# Patient Record
Sex: FEMALE | Race: WHITE | NOT HISPANIC OR LATINO | URBAN - METROPOLITAN AREA
[De-identification: names, ages, dates, MRNs, and addresses within clinical notes are randomized per-mention and may not be internally consistent; named-entity substitution may affect disease eponyms.]

---

## 2017-10-20 ENCOUNTER — EMERGENCY (EMERGENCY)
Facility: HOSPITAL | Age: 67
LOS: 1 days | Discharge: HOME | End: 2017-10-20
Attending: EMERGENCY MEDICINE | Admitting: EMERGENCY MEDICINE
Payer: MEDICARE

## 2017-10-20 VITALS
HEART RATE: 75 BPM | OXYGEN SATURATION: 97 % | TEMPERATURE: 98 F | SYSTOLIC BLOOD PRESSURE: 140 MMHG | DIASTOLIC BLOOD PRESSURE: 71 MMHG | RESPIRATION RATE: 18 BRPM

## 2017-10-20 DIAGNOSIS — S00.83XA CONTUSION OF OTHER PART OF HEAD, INITIAL ENCOUNTER: ICD-10-CM

## 2017-10-20 DIAGNOSIS — S09.93XA UNSPECIFIED INJURY OF FACE, INITIAL ENCOUNTER: ICD-10-CM
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
## 2017-10-20 DIAGNOSIS — S46.112A STRAIN OF MUSCLE, FASCIA AND TENDON OF LONG HEAD OF BICEPS, LEFT ARM, INITIAL ENCOUNTER: Chronic | ICD-10-CM

## 2017-10-20 DIAGNOSIS — E78.5 HYPERLIPIDEMIA, UNSPECIFIED: ICD-10-CM

## 2017-10-20 DIAGNOSIS — J45.909 UNSPECIFIED ASTHMA, UNCOMPLICATED: ICD-10-CM

## 2017-10-20 DIAGNOSIS — Z96.652 PRESENCE OF LEFT ARTIFICIAL KNEE JOINT: Chronic | ICD-10-CM
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
## 2017-10-20 DIAGNOSIS — I10 ESSENTIAL (PRIMARY) HYPERTENSION: ICD-10-CM

## 2017-10-20 PROCEDURE — 70486 CT MAXILLOFACIAL W/O DYE: CPT

## 2017-10-20 PROCEDURE — 99284 EMERGENCY DEPT VISIT MOD MDM: CPT | Mod: 25

## 2017-10-20 PROCEDURE — 99284 EMERGENCY DEPT VISIT MOD MDM: CPT | Mod: GC

## 2017-10-20 PROCEDURE — 70450 CT HEAD/BRAIN W/O DYE: CPT

## 2017-10-20 PROCEDURE — 70486 CT MAXILLOFACIAL W/O DYE: CPT | Mod: 26

## 2017-10-20 PROCEDURE — 70450 CT HEAD/BRAIN W/O DYE: CPT | Mod: 26

## 2017-10-20 RX ORDER — ACETAMINOPHEN 500 MG
650 TABLET ORAL ONCE
Qty: 0 | Refills: 0 | Status: COMPLETED | OUTPATIENT
Start: 2017-10-20 | End: 2017-10-20

## 2017-10-20 RX ADMIN — Medication 650 MILLIGRAM(S): at 21:26

## 2017-10-20 NOTE — ED PROVIDER NOTE - PMH
Anxiety    Asthma    HTN (hypertension)    Osteoarthritis of both knees    Rotator cuff arthropathy, left    Thyroid disease
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
## 2017-10-20 NOTE — ED PROVIDER NOTE - ENMT, MLM
Swelling left maxilla with bruising, hematoma above left lip. Tender to palpation, swelling left cheek. PERLLA. No open cuts or lacerations.
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
## 2018-01-23 ENCOUNTER — EMERGENCY (EMERGENCY)
Facility: HOSPITAL | Age: 68
LOS: 1 days | Discharge: HOME | End: 2018-01-23
Attending: EMERGENCY MEDICINE | Admitting: EMERGENCY MEDICINE
Payer: MEDICARE

## 2018-01-23 VITALS
RESPIRATION RATE: 18 BRPM | TEMPERATURE: 98 F | DIASTOLIC BLOOD PRESSURE: 79 MMHG | OXYGEN SATURATION: 97 % | SYSTOLIC BLOOD PRESSURE: 157 MMHG | HEART RATE: 90 BPM

## 2018-01-23 DIAGNOSIS — M54.9 DORSALGIA, UNSPECIFIED: ICD-10-CM

## 2018-01-23 DIAGNOSIS — I10 ESSENTIAL (PRIMARY) HYPERTENSION: ICD-10-CM

## 2018-01-23 DIAGNOSIS — J45.909 UNSPECIFIED ASTHMA, UNCOMPLICATED: ICD-10-CM

## 2018-01-23 DIAGNOSIS — S46.112A STRAIN OF MUSCLE, FASCIA AND TENDON OF LONG HEAD OF BICEPS, LEFT ARM, INITIAL ENCOUNTER: Chronic | ICD-10-CM

## 2018-01-23 DIAGNOSIS — Z79.899 OTHER LONG TERM (CURRENT) DRUG THERAPY: ICD-10-CM

## 2018-01-23 DIAGNOSIS — R10.9 UNSPECIFIED ABDOMINAL PAIN: ICD-10-CM

## 2018-01-23 DIAGNOSIS — Z79.2 LONG TERM (CURRENT) USE OF ANTIBIOTICS: ICD-10-CM

## 2018-01-23 DIAGNOSIS — Z96.652 PRESENCE OF LEFT ARTIFICIAL KNEE JOINT: Chronic | ICD-10-CM

## 2018-01-23 PROCEDURE — 99284 EMERGENCY DEPT VISIT MOD MDM: CPT

## 2018-01-23 RX ORDER — HYDROMORPHONE HYDROCHLORIDE 2 MG/ML
1 INJECTION INTRAMUSCULAR; INTRAVENOUS; SUBCUTANEOUS ONCE
Qty: 0 | Refills: 0 | Status: DISCONTINUED | OUTPATIENT
Start: 2018-01-23 | End: 2018-01-23

## 2018-01-23 NOTE — ED PROVIDER NOTE - PHYSICAL EXAMINATION
moderate distress reported. Pulses equal and regular bilaterally all extremities + mottled discolored appearance to skin bilateral upper thighs ( patient states nightly > 1 month ) able to independently SLR bilaterally moderate distress reported. Pulses equal and regular bilaterally all extremities + mottled discolored appearance to skin bilateral upper thighs ( patient states nightly > 1 month ) able to independently SLR bilaterally moves LE on command compartments soft skin sensation intact to LT

## 2018-01-23 NOTE — ED PROVIDER NOTE - OBJECTIVE STATEMENT
patient upgraded secondary to severe pain as patient reports abdominal bloating and pain as well as back pain this afternoon commenced into this evening and thus to ED + Hx back pain but states this different + denies Hx of abdominal sx patient upgraded secondary to severe pain as patient reports abdominal bloating and pain as well as back pain this afternoon commenced into this evening and thus to ED + Hx back pain but states this different + denies Hx of abdominal sx no numbness no tingling no b/B pathology

## 2018-01-23 NOTE — ED PROVIDER NOTE - ATTENDING CONTRIBUTION TO CARE
67F with severe back pain, hx of spinal stenosis in neck. no leg weakness, no bowel/bladder incontinence, reporting bloating. States she has had back pain but not this severe. No vomiting. Pt is upgrade due to pain, screaming due to pain, no nausea, no vomiting, pt given dilaudid x 3 , cta chest abd pelvis shows dilated gb only, no secondary sign of cholecystitis and no ruq ttp, no vomiting. plan for dc home.

## 2018-01-23 NOTE — ED PROVIDER NOTE - MEDICAL DECISION MAKING DETAILS
difficult to amalgamate upon presentation as so uncomfortable + CTA ordered given disproportionate pain abdomen/ back along with mottled leg discoloration ( albeit reported as nightly / intermittent for some time ) + analgesic relief given difficult to amalgamate upon presentation as so uncomfortable + CTA ordered given disproportionate pain abdomen/ back along with mottled leg discoloration ( albeit reported as nightly / intermittent for some time ) + analgesic relief given-> CTA notes no acute concerning pathology ( discussed GB findings as enlarged ) Pain intermittent and spasm like at this time and patient up mobile  ( offered admission  ) difficult to amalgamate upon presentation as so uncomfortable + CTA ordered given disproportionate pain abdomen/ back along with mottled leg discoloration ( albeit reported as nightly / intermittent for some time ) + analgesic relief given-> CTA notes no acute concerning pathology ( discussed GB findings as enlarged ) Pain intermittent and spasm like at this time and patient up mobile  ( offered admission but declines )

## 2018-01-24 VITALS
RESPIRATION RATE: 18 BRPM | SYSTOLIC BLOOD PRESSURE: 162 MMHG | HEART RATE: 67 BPM | DIASTOLIC BLOOD PRESSURE: 83 MMHG | TEMPERATURE: 98 F | OXYGEN SATURATION: 99 %

## 2018-01-24 LAB
ALBUMIN SERPL ELPH-MCNC: 4.5 G/DL — SIGNIFICANT CHANGE UP (ref 3.3–5)
ALP SERPL-CCNC: 73 U/L — SIGNIFICANT CHANGE UP (ref 40–120)
ALT FLD-CCNC: 20 U/L — SIGNIFICANT CHANGE UP (ref 10–45)
ANION GAP SERPL CALC-SCNC: 17 MMOL/L — SIGNIFICANT CHANGE UP (ref 5–17)
APPEARANCE UR: CLEAR — SIGNIFICANT CHANGE UP
APTT BLD: 29.8 SEC — SIGNIFICANT CHANGE UP (ref 27.5–37.4)
AST SERPL-CCNC: 26 U/L — SIGNIFICANT CHANGE UP (ref 10–40)
BASE EXCESS BLDV CALC-SCNC: 2.8 MMOL/L — SIGNIFICANT CHANGE UP
BASOPHILS NFR BLD AUTO: 0.2 % — SIGNIFICANT CHANGE UP (ref 0–2)
BILIRUB SERPL-MCNC: 0.5 MG/DL — SIGNIFICANT CHANGE UP (ref 0.2–1.2)
BILIRUB UR-MCNC: NEGATIVE — SIGNIFICANT CHANGE UP
BUN SERPL-MCNC: 19 MG/DL — SIGNIFICANT CHANGE UP (ref 7–23)
CALCIUM SERPL-MCNC: 9.4 MG/DL — SIGNIFICANT CHANGE UP (ref 8.4–10.5)
CHLORIDE SERPL-SCNC: 89 MMOL/L — LOW (ref 96–108)
CK MB CFR SERPL CALC: 10 NG/ML — HIGH (ref 0–6.7)
CO2 SERPL-SCNC: 25 MMOL/L — SIGNIFICANT CHANGE UP (ref 22–31)
COLOR SPEC: YELLOW — SIGNIFICANT CHANGE UP
CREAT SERPL-MCNC: 0.69 MG/DL — SIGNIFICANT CHANGE UP (ref 0.5–1.3)
DIFF PNL FLD: (no result)
EOSINOPHIL NFR BLD AUTO: 0.1 % — SIGNIFICANT CHANGE UP (ref 0–6)
GAS PNL BLDV: SIGNIFICANT CHANGE UP
GLUCOSE SERPL-MCNC: 148 MG/DL — HIGH (ref 70–99)
GLUCOSE UR QL: NEGATIVE — SIGNIFICANT CHANGE UP
HCO3 BLDV-SCNC: 28 MMOL/L — HIGH (ref 20–27)
HCT VFR BLD CALC: 40.8 % — SIGNIFICANT CHANGE UP (ref 34.5–45)
HGB BLD-MCNC: 13.7 G/DL — SIGNIFICANT CHANGE UP (ref 11.5–15.5)
INR BLD: 0.97 — SIGNIFICANT CHANGE UP (ref 0.88–1.16)
KETONES UR-MCNC: NEGATIVE — SIGNIFICANT CHANGE UP
LACTATE SERPL-SCNC: 2.5 MMOL/L — HIGH (ref 0.5–2)
LEUKOCYTE ESTERASE UR-ACNC: NEGATIVE — SIGNIFICANT CHANGE UP
LIDOCAIN IGE QN: 18 U/L — SIGNIFICANT CHANGE UP (ref 7–60)
LYMPHOCYTES # BLD AUTO: 5.3 % — LOW (ref 13–44)
MAGNESIUM SERPL-MCNC: 1.7 MG/DL — SIGNIFICANT CHANGE UP (ref 1.6–2.6)
MCHC RBC-ENTMCNC: 30.5 PG — SIGNIFICANT CHANGE UP (ref 27–34)
MCHC RBC-ENTMCNC: 33.6 G/DL — SIGNIFICANT CHANGE UP (ref 32–36)
MCV RBC AUTO: 90.9 FL — SIGNIFICANT CHANGE UP (ref 80–100)
MONOCYTES NFR BLD AUTO: 6.2 % — SIGNIFICANT CHANGE UP (ref 2–14)
NEUTROPHILS NFR BLD AUTO: 88.2 % — HIGH (ref 43–77)
NITRITE UR-MCNC: NEGATIVE — SIGNIFICANT CHANGE UP
NT-PROBNP SERPL-SCNC: 42 PG/ML — SIGNIFICANT CHANGE UP (ref 0–300)
PCO2 BLDV: 44 MMHG — SIGNIFICANT CHANGE UP (ref 41–51)
PH BLDV: 7.42 — SIGNIFICANT CHANGE UP (ref 7.32–7.43)
PH UR: 6 — SIGNIFICANT CHANGE UP (ref 5–8)
PLATELET # BLD AUTO: 328 K/UL — SIGNIFICANT CHANGE UP (ref 150–400)
PO2 BLDV: 31 MMHG — SIGNIFICANT CHANGE UP
POTASSIUM SERPL-MCNC: 3.4 MMOL/L — LOW (ref 3.5–5.3)
POTASSIUM SERPL-SCNC: 3.4 MMOL/L — LOW (ref 3.5–5.3)
PROT SERPL-MCNC: 7.6 G/DL — SIGNIFICANT CHANGE UP (ref 6–8.3)
PROT UR-MCNC: NEGATIVE MG/DL — SIGNIFICANT CHANGE UP
PROTHROM AB SERPL-ACNC: 10.8 SEC — SIGNIFICANT CHANGE UP (ref 9.8–12.7)
RBC # BLD: 4.49 M/UL — SIGNIFICANT CHANGE UP (ref 3.8–5.2)
RBC # FLD: 13.2 % — SIGNIFICANT CHANGE UP (ref 10.3–16.9)
SAO2 % BLDV: 53 % — SIGNIFICANT CHANGE UP
SODIUM SERPL-SCNC: 131 MMOL/L — LOW (ref 135–145)
SP GR SPEC: 1.01 — SIGNIFICANT CHANGE UP (ref 1–1.03)
TROPONIN T SERPL-MCNC: <0.01 NG/ML — SIGNIFICANT CHANGE UP (ref 0–0.01)
TSH SERPL-MCNC: 1.14 UIU/ML — SIGNIFICANT CHANGE UP (ref 0.35–4.94)
UROBILINOGEN FLD QL: 0.2 E.U./DL — SIGNIFICANT CHANGE UP
WBC # BLD: 16 K/UL — HIGH (ref 3.8–10.5)
WBC # FLD AUTO: 16 K/UL — HIGH (ref 3.8–10.5)

## 2018-01-24 PROCEDURE — 74174 CTA ABD&PLVS W/CONTRAST: CPT

## 2018-01-24 PROCEDURE — 83605 ASSAY OF LACTIC ACID: CPT

## 2018-01-24 PROCEDURE — 84484 ASSAY OF TROPONIN QUANT: CPT

## 2018-01-24 PROCEDURE — 74174 CTA ABD&PLVS W/CONTRAST: CPT | Mod: 26

## 2018-01-24 PROCEDURE — 71275 CT ANGIOGRAPHY CHEST: CPT | Mod: 26

## 2018-01-24 PROCEDURE — 96374 THER/PROPH/DIAG INJ IV PUSH: CPT | Mod: XU

## 2018-01-24 PROCEDURE — 85730 THROMBOPLASTIN TIME PARTIAL: CPT

## 2018-01-24 PROCEDURE — 83735 ASSAY OF MAGNESIUM: CPT

## 2018-01-24 PROCEDURE — 84443 ASSAY THYROID STIM HORMONE: CPT

## 2018-01-24 PROCEDURE — 82553 CREATINE MB FRACTION: CPT

## 2018-01-24 PROCEDURE — 99284 EMERGENCY DEPT VISIT MOD MDM: CPT | Mod: 25

## 2018-01-24 PROCEDURE — 85610 PROTHROMBIN TIME: CPT

## 2018-01-24 PROCEDURE — 36415 COLL VENOUS BLD VENIPUNCTURE: CPT

## 2018-01-24 PROCEDURE — 83880 ASSAY OF NATRIURETIC PEPTIDE: CPT

## 2018-01-24 PROCEDURE — 96376 TX/PRO/DX INJ SAME DRUG ADON: CPT | Mod: XU

## 2018-01-24 PROCEDURE — 85025 COMPLETE CBC W/AUTO DIFF WBC: CPT

## 2018-01-24 PROCEDURE — 82803 BLOOD GASES ANY COMBINATION: CPT

## 2018-01-24 PROCEDURE — 80053 COMPREHEN METABOLIC PANEL: CPT

## 2018-01-24 PROCEDURE — 82550 ASSAY OF CK (CPK): CPT

## 2018-01-24 PROCEDURE — 83690 ASSAY OF LIPASE: CPT

## 2018-01-24 PROCEDURE — 71275 CT ANGIOGRAPHY CHEST: CPT

## 2018-01-24 PROCEDURE — 81001 URINALYSIS AUTO W/SCOPE: CPT

## 2018-01-24 RX ORDER — SODIUM CHLORIDE 9 MG/ML
1000 INJECTION INTRAMUSCULAR; INTRAVENOUS; SUBCUTANEOUS ONCE
Qty: 0 | Refills: 0 | Status: COMPLETED | OUTPATIENT
Start: 2018-01-24 | End: 2018-01-24

## 2018-01-24 RX ORDER — HYDROMORPHONE HYDROCHLORIDE 2 MG/ML
1 INJECTION INTRAMUSCULAR; INTRAVENOUS; SUBCUTANEOUS ONCE
Qty: 0 | Refills: 0 | Status: DISCONTINUED | OUTPATIENT
Start: 2018-01-24 | End: 2018-01-24

## 2018-01-24 RX ORDER — OXYCODONE HYDROCHLORIDE 5 MG/1
1 TABLET ORAL
Qty: 20 | Refills: 0 | OUTPATIENT
Start: 2018-01-24

## 2018-01-24 RX ADMIN — HYDROMORPHONE HYDROCHLORIDE 1 MILLIGRAM(S): 2 INJECTION INTRAMUSCULAR; INTRAVENOUS; SUBCUTANEOUS at 03:13

## 2018-01-24 RX ADMIN — HYDROMORPHONE HYDROCHLORIDE 1 MILLIGRAM(S): 2 INJECTION INTRAMUSCULAR; INTRAVENOUS; SUBCUTANEOUS at 00:40

## 2018-01-24 RX ADMIN — SODIUM CHLORIDE 1000 MILLILITER(S): 9 INJECTION INTRAMUSCULAR; INTRAVENOUS; SUBCUTANEOUS at 01:52

## 2018-01-24 RX ADMIN — HYDROMORPHONE HYDROCHLORIDE 1 MILLIGRAM(S): 2 INJECTION INTRAMUSCULAR; INTRAVENOUS; SUBCUTANEOUS at 00:01

## 2018-01-24 RX ADMIN — HYDROMORPHONE HYDROCHLORIDE 1 MILLIGRAM(S): 2 INJECTION INTRAMUSCULAR; INTRAVENOUS; SUBCUTANEOUS at 01:52

## 2018-02-12 ENCOUNTER — INPATIENT (INPATIENT)
Facility: HOSPITAL | Age: 68
LOS: 3 days | Discharge: HOME | DRG: 813 | End: 2018-02-16
Attending: STUDENT IN AN ORGANIZED HEALTH CARE EDUCATION/TRAINING PROGRAM | Admitting: STUDENT IN AN ORGANIZED HEALTH CARE EDUCATION/TRAINING PROGRAM
Payer: MEDICARE

## 2018-02-12 VITALS
RESPIRATION RATE: 18 BRPM | SYSTOLIC BLOOD PRESSURE: 96 MMHG | TEMPERATURE: 97 F | HEART RATE: 111 BPM | OXYGEN SATURATION: 97 % | DIASTOLIC BLOOD PRESSURE: 58 MMHG

## 2018-02-12 DIAGNOSIS — J45.909 UNSPECIFIED ASTHMA, UNCOMPLICATED: ICD-10-CM

## 2018-02-12 DIAGNOSIS — E03.9 HYPOTHYROIDISM, UNSPECIFIED: ICD-10-CM

## 2018-02-12 DIAGNOSIS — Z96.652 PRESENCE OF LEFT ARTIFICIAL KNEE JOINT: Chronic | ICD-10-CM

## 2018-02-12 DIAGNOSIS — N17.9 ACUTE KIDNEY FAILURE, UNSPECIFIED: ICD-10-CM

## 2018-02-12 DIAGNOSIS — D69.6 THROMBOCYTOPENIA, UNSPECIFIED: ICD-10-CM

## 2018-02-12 DIAGNOSIS — S46.112A STRAIN OF MUSCLE, FASCIA AND TENDON OF LONG HEAD OF BICEPS, LEFT ARM, INITIAL ENCOUNTER: Chronic | ICD-10-CM

## 2018-02-12 DIAGNOSIS — Z29.9 ENCOUNTER FOR PROPHYLACTIC MEASURES, UNSPECIFIED: ICD-10-CM

## 2018-02-12 DIAGNOSIS — M46.20 OSTEOMYELITIS OF VERTEBRA, SITE UNSPECIFIED: ICD-10-CM

## 2018-02-12 DIAGNOSIS — I10 ESSENTIAL (PRIMARY) HYPERTENSION: ICD-10-CM

## 2018-02-12 DIAGNOSIS — F41.9 ANXIETY DISORDER, UNSPECIFIED: ICD-10-CM

## 2018-02-12 DIAGNOSIS — R63.8 OTHER SYMPTOMS AND SIGNS CONCERNING FOOD AND FLUID INTAKE: ICD-10-CM

## 2018-02-12 DIAGNOSIS — D64.9 ANEMIA, UNSPECIFIED: ICD-10-CM

## 2018-02-12 DIAGNOSIS — K59.00 CONSTIPATION, UNSPECIFIED: ICD-10-CM

## 2018-02-12 LAB
ALBUMIN SERPL ELPH-MCNC: 3.4 G/DL — SIGNIFICANT CHANGE UP (ref 3.3–5)
ALP SERPL-CCNC: 46 U/L — SIGNIFICANT CHANGE UP (ref 40–120)
ALT FLD-CCNC: 40 U/L — SIGNIFICANT CHANGE UP (ref 10–45)
ANION GAP SERPL CALC-SCNC: 14 MMOL/L — SIGNIFICANT CHANGE UP (ref 5–17)
APTT BLD: 30.6 SEC — SIGNIFICANT CHANGE UP (ref 27.5–37.4)
AST SERPL-CCNC: 45 U/L — HIGH (ref 10–40)
BASOPHILS NFR BLD AUTO: 2 % — SIGNIFICANT CHANGE UP (ref 0–2)
BILIRUB SERPL-MCNC: 0.4 MG/DL — SIGNIFICANT CHANGE UP (ref 0.2–1.2)
BLD GP AB SCN SERPL QL: NEGATIVE — SIGNIFICANT CHANGE UP
BLD GP AB SCN SERPL QL: NEGATIVE — SIGNIFICANT CHANGE UP
BUN SERPL-MCNC: 47 MG/DL — HIGH (ref 7–23)
CALCIUM SERPL-MCNC: 9 MG/DL — SIGNIFICANT CHANGE UP (ref 8.4–10.5)
CHLORIDE SERPL-SCNC: 94 MMOL/L — LOW (ref 96–108)
CO2 SERPL-SCNC: 27 MMOL/L — SIGNIFICANT CHANGE UP (ref 22–31)
CREAT ?TM UR-MCNC: 93 MG/DL — SIGNIFICANT CHANGE UP
CREAT SERPL-MCNC: 2.03 MG/DL — HIGH (ref 0.5–1.3)
EOSINOPHIL NFR BLD AUTO: 6 % — SIGNIFICANT CHANGE UP (ref 0–6)
FIBRINOGEN PPP-MCNC: 591 MG/DL — HIGH (ref 258–438)
GLUCOSE SERPL-MCNC: 115 MG/DL — HIGH (ref 70–99)
HCT VFR BLD CALC: 28 % — LOW (ref 34.5–45)
HGB BLD-MCNC: 10.2 G/DL — LOW (ref 11.5–15.5)
INR BLD: 1.26 — HIGH (ref 0.88–1.16)
LYMPHOCYTES # BLD AUTO: 15 % — SIGNIFICANT CHANGE UP (ref 13–44)
MCHC RBC-ENTMCNC: 31.1 PG — SIGNIFICANT CHANGE UP (ref 27–34)
MCHC RBC-ENTMCNC: 36.4 G/DL — HIGH (ref 32–36)
MCV RBC AUTO: 85.4 FL — SIGNIFICANT CHANGE UP (ref 80–100)
MONOCYTES NFR BLD AUTO: 12 % — SIGNIFICANT CHANGE UP (ref 2–14)
NEUTROPHILS NFR BLD AUTO: 57 % — SIGNIFICANT CHANGE UP (ref 43–77)
OB PNL STL: NEGATIVE — SIGNIFICANT CHANGE UP
PLATELET # BLD AUTO: 2 K/UL — CRITICAL LOW (ref 150–400)
POTASSIUM SERPL-MCNC: 3.1 MMOL/L — LOW (ref 3.5–5.3)
POTASSIUM SERPL-SCNC: 3.1 MMOL/L — LOW (ref 3.5–5.3)
PROT SERPL-MCNC: 6.3 G/DL — SIGNIFICANT CHANGE UP (ref 6–8.3)
PROTHROM AB SERPL-ACNC: 14.1 SEC — HIGH (ref 9.8–12.7)
RBC # BLD: 3.28 M/UL — LOW (ref 3.8–5.2)
RBC # FLD: 13 % — SIGNIFICANT CHANGE UP (ref 10.3–16.9)
RH IG SCN BLD-IMP: POSITIVE — SIGNIFICANT CHANGE UP
RH IG SCN BLD-IMP: POSITIVE — SIGNIFICANT CHANGE UP
SODIUM SERPL-SCNC: 135 MMOL/L — SIGNIFICANT CHANGE UP (ref 135–145)
SODIUM UR-SCNC: 26 MMOL/L — SIGNIFICANT CHANGE UP
WBC # BLD: 8.4 K/UL — SIGNIFICANT CHANGE UP (ref 3.8–10.5)
WBC # FLD AUTO: 8.4 K/UL — SIGNIFICANT CHANGE UP (ref 3.8–10.5)

## 2018-02-12 PROCEDURE — 99283 EMERGENCY DEPT VISIT LOW MDM: CPT

## 2018-02-12 PROCEDURE — 74018 RADEX ABDOMEN 1 VIEW: CPT | Mod: 26

## 2018-02-12 PROCEDURE — 70450 CT HEAD/BRAIN W/O DYE: CPT | Mod: 26

## 2018-02-12 RX ORDER — LACTULOSE 10 G/15ML
10 SOLUTION ORAL DAILY
Qty: 0 | Refills: 0 | Status: DISCONTINUED | OUTPATIENT
Start: 2018-02-12 | End: 2018-02-16

## 2018-02-12 RX ORDER — LEVOTHYROXINE SODIUM 125 MCG
12.5 TABLET ORAL DAILY
Qty: 0 | Refills: 0 | Status: DISCONTINUED | OUTPATIENT
Start: 2018-02-12 | End: 2018-02-13

## 2018-02-12 RX ORDER — IMMUNE GLOBULIN,GAMMA(IGG) 5 %
35 VIAL (ML) INTRAVENOUS ONCE
Qty: 0 | Refills: 0 | Status: COMPLETED | OUTPATIENT
Start: 2018-02-12 | End: 2018-02-13

## 2018-02-12 RX ORDER — ATORVASTATIN CALCIUM 80 MG/1
10 TABLET, FILM COATED ORAL AT BEDTIME
Qty: 0 | Refills: 0 | Status: DISCONTINUED | OUTPATIENT
Start: 2018-02-12 | End: 2018-02-13

## 2018-02-12 RX ORDER — DEXAMETHASONE 0.5 MG/5ML
40 ELIXIR ORAL ONCE
Qty: 0 | Refills: 0 | Status: COMPLETED | OUTPATIENT
Start: 2018-02-12 | End: 2018-02-12

## 2018-02-12 RX ORDER — DEXAMETHASONE 0.5 MG/5ML
40 ELIXIR ORAL DAILY
Qty: 0 | Refills: 0 | Status: DISCONTINUED | OUTPATIENT
Start: 2018-02-13 | End: 2018-02-13

## 2018-02-12 RX ORDER — DEXAMETHASONE 0.5 MG/5ML
40 ELIXIR ORAL ONCE
Qty: 0 | Refills: 0 | Status: DISCONTINUED | OUTPATIENT
Start: 2018-02-12 | End: 2018-02-12

## 2018-02-12 RX ORDER — ALPRAZOLAM 0.25 MG
0.5 TABLET ORAL ONCE
Qty: 0 | Refills: 0 | Status: DISCONTINUED | OUTPATIENT
Start: 2018-02-12 | End: 2018-02-12

## 2018-02-12 RX ADMIN — Medication 0.5 MILLIGRAM(S): at 14:51

## 2018-02-12 RX ADMIN — Medication 120 MILLIGRAM(S): at 16:23

## 2018-02-12 NOTE — ED ADULT NURSE REASSESSMENT NOTE - NS ED NURSE REASSESS COMMENT FT1
Pending for platelets transfusion. Will draw 2nd T&S. No consent obtained yet and MD Timmons has been made aware.

## 2018-02-12 NOTE — ED ADULT NURSE NOTE - OBJECTIVE STATEMENT
Pt's  reports that pt has a PICC line and has been given heparin after admin of Vanco, however pt started having bleeding in the gums, hematomas to the bottom lip. Pt reports that PCP told pt to come to ED to get Vitamin K. Pt denies any falls, denies hitting head, pain or any other symptoms. Pt's  reports that pt has a PICC line and has been given heparin after admin of Vanco, however pt started having bleeding in the gums, hematomas to the bottom lip. Pt reports that PCP told pt to come to ED to get Vitamin K. Pt denies any falls, denies hitting head, pain or any other symptoms. Pt speaking in full sentences, denies SOB, not drooling, airway patent. Pt's  reports that pt has a PICC line and has been given heparin after admin of Vanco, however pt started having bleeding in the gums, hematomas to the bottom lip. Pt reports that PCP told pt to come to ED to get Vitamin K. Pt denies any falls, denies hitting head, pain or any other symptoms. Pt speaking in full sentences, denies SOB, not drooling, airway patent. Pt also reports not having a bowel movement in 1 week, however has been passing gas daily. Pt reports nausea and vomiting once daily for the past 5 days, "tea colored" however vomited today and noticed that the vomit was "coffee colored."

## 2018-02-12 NOTE — ED ADULT NURSE NOTE - CHPI ED SYMPTOMS NEG
no numbness/no fever/no dizziness/no weakness/no decreased eating/drinking/no pain/no tingling/no vomiting/no nausea/no chills no numbness/no fever/no weakness/no pain/no dizziness/no tingling/no vomiting/no nausea/no chills

## 2018-02-12 NOTE — H&P ADULT - PROBLEM SELECTOR PLAN 9
F: None  E: Replete PRN  N: Dash diet  FULL CODE  Dispo: 7 Lach Will hold off on pharmacological prophylaxis given high risk of bleed.

## 2018-02-12 NOTE — H&P ADULT - ASSESSMENT
66 yo female pmh of hypothyroid, HTN, who was sent to the ED for spontaneous gum bleeding, found to have platelet of 2k, likely decreased 2/2 ITP vs HIT vs abx induced thrombocytopenia.

## 2018-02-12 NOTE — H&P ADULT - NSHPREVIEWOFSYSTEMS_GEN_ALL_CORE
REVIEW OF SYSTEMS:    CONSTITUTIONAL: Patient denies weakness, fevers or chills  EYES/ENT: Patient denies visual changes;  denies vertigo or throat pain   NECK: Patient denies pain or stiffness  RESPIRATORY: Patient denies cough, wheezing, hemoptysis; denies shortness of breath  CARDIOVASCULAR: Patient denies chest pain or palpitations  GASTROINTESTINAL: Patient denies abdominal or epigastric pain. Patient has had consitpation for last five days has not had bowel movement, has had vomiting after   GENITOURINARY: Patient denies dysuria, frequency or hematuria  NEUROLOGICAL: Patient denies numbness or weakness  SKIN: Patient denies itching, burning, rashes, or lesions   All other review of systems is negative unless indicated above. REVIEW OF SYSTEMS:    CONSTITUTIONAL: Patient denies weakness, fevers or chills  EYES/ENT: Patient denies visual changes;  denies vertigo or throat pain, has tasted blood for three days and has had confirmed nose bleed today  NECK: Patient denies pain or stiffness  RESPIRATORY: Patient denies cough, wheezing, hemoptysis; denies shortness of breath  CARDIOVASCULAR: Patient denies chest pain or palpitations  GASTROINTESTINAL: Patient denies abdominal or epigastric pain. + Patient has had constipation for last five days has not had bowel movement, has had vomiting after every meal for the past 3-4 days. No blood in vomit, did once bring up clot (has been tasting blood coming from nose)  GENITOURINARY: Patient denies dysuria, frequency or hematuria  NEUROLOGICAL: Patient denies numbness or weakness. Back pain during infection has resolved  SKIN: Patient denies itching, burning. + has had clots in lip and hemorrhage from gums since last Wednesday.   All other review of systems is negative unless indicated above.

## 2018-02-12 NOTE — H&P ADULT - NSHPPHYSICALEXAM_GEN_ALL_CORE
Vital Signs Last 12 Hrs  T(F): 97.5 (02-12-18 @ 16:20), Max: 97.6 (02-12-18 @ 14:49)  HR: 92 (02-12-18 @ 16:20) (88 - 111)  BP: 103/63 (02-12-18 @ 16:20) (96/58 - 103/63)  BP(mean): --  RR: 18 (02-12-18 @ 16:20) (18 - 18)  SpO2: 97% (02-12-18 @ 16:20) (96% - 98%)    PHYSICAL EXAM:  Constitutional: NAD, comfortable in bed.  HEENT: NC/AT, PERRLA, EOMI, no conjunctival pallor or scleral icterus, MMM  Neck: Supple, no JVD  Respiratory: Normal rate, rhythm, depth, effort. CTAB. No w/r/r.   Cardiovascular: RRR, normal S1 and S2, no m/r/g.   Gastrointestinal: +BS, soft NTND, no guarding or rebound tenderness, no palpable masses   Extremities: wwp; no cyanosis, clubbing or edema.   Vascular: Pulses equal and strong throughout.   Neurological: AAOx3, no CN deficits, strength and sensation intact throughout.   Skin: diffuse ecchymosis Vital Signs Last 12 Hrs  T(F): 97.5 (02-12-18 @ 16:20), Max: 97.6 (02-12-18 @ 14:49)  HR: 92 (02-12-18 @ 16:20) (88 - 111)  BP: 103/63 (02-12-18 @ 16:20) (96/58 - 103/63)  BP(mean): --  RR: 18 (02-12-18 @ 16:20) (18 - 18)  SpO2: 97% (02-12-18 @ 16:20) (96% - 98%)    PHYSICAL EXAM:  Constitutional: NAD, comfortable in bed.  HEENT: NC/AT, PERRLA, EOMI, no conjunctival pallor or scleral icterus, MMM, dried blood in gums, petechia in oropharynx   Neck: Supple, no JVD  Respiratory: Normal rate, rhythm, depth, effort. CTAB. No w/r/r.   Cardiovascular: RRR, normal S1 and S2, no m/r/g.   Gastrointestinal: +BS, soft NTND, no guarding or rebound tenderness, no palpable masses   Extremities: wwp; no cyanosis, clubbing or edema.   Vascular: Pulses equal and strong throughout.   Neurological: AAOx3, no CN deficits, strength and sensation intact throughout.   Skin: ecchymosis on UE and buttocks

## 2018-02-12 NOTE — CONSULT NOTE ADULT - SUBJECTIVE AND OBJECTIVE BOX
Patient is a 68 yo female pmh of hypothyroid, HTN, who was sent to the ED for abnormal lab. Patient was recently discharged from [facility] for possible spinal osteo with PICC line for IV vancomycin/Levaquin, heparin flushes. She presented to today for gum bleeding started Wednesday, spontaneous, multiple episodes, and with epitaxis today. Also noticed to have echymosis on her belly and arm when she get the SuBQ injection in the hospital. Never had similar problem like this before. Her PCP reshma her lab (only coag) and advised the patient to go the ED for vitamin K shot. Other wise she has no other complain, no fever/chill, no n/v, still have abdominal pain and back pain, which persistent since mid January. No diarrhea, no black stool but has been conspitated for over a week.   Patient denied bleeding like this in the past, no family history of bleeding disorder, never had children but no past miscarriages no lupus in family.  In the ED, she was afebrile, , BP 96/58, sat 97% on RA. She was noticed to have platelet of 2, hematology was consulted and reviewed the smear, 1 unit of platelet ordered. ICU was consulted.    PMH: as above  PSH: left knee replacement  SH: no tob, no etoh, no drug  ALL: NKDA       ( -  )fevers/chills  ( - ) dyspnea  (  - ) cough  (  - ) chest pain  (  - ) palpatations  ( - ) dizziness/lightheadedness  (  - ) nausea/vomiting  (  + ) abd pain  (  - ) diarrhea  (  - ) melena  (  - ) hematochezia  (  - ) dysuria  ( - ) hematuria  (  - ) leg swelling  ( -) calf tenderness  (  - ) motor weakness  (  - ) extremity numbness  ( - ) back pain  ( + ) tolerating POs  ( - ) BM  ROS: 12 point review of systems otherwise negative     ICU Vital Signs Last 24 Hrs  T(C): 36.4 (12 Feb 2018 14:49), Max: 36.4 (12 Feb 2018 14:49)  T(F): 97.6 (12 Feb 2018 14:49), Max: 97.6 (12 Feb 2018 14:49)  HR: 88 (12 Feb 2018 14:49) (88 - 111)  BP: 101/62 (12 Feb 2018 14:49) (96/58 - 102/61)  BP(mean): --  ABP: --  ABP(mean): --  RR: 18 (12 Feb 2018 14:49) (18 - 18)  SpO2: 96% (12 Feb 2018 14:49) (96% - 98%)    PHYSICAL EXAM:      Constitutional: NAD  Eyes: PERRLA, EOMI  ENMT: MMM, dry scabbing on lips, echymosis/purpura on gum, tongue, and mouth, no lymphadenopathy  Respiratory: CTAB, no r/r  Cardiovascular: RRR, audible S1S2, no murmur/rub/gallop  Gastrointestinal: Soft, mild tenderness to palpation on left LQ  Extremities: no edema, no ulceration  Vascular: DPs and Radial pulse palpaple  Neurological: AnOx3, CN II-XII intact  Skin: diffuse peticheal rash on trunk, back and LEs, echymosis on left upper arm.  Lymph Nodes: no lympadenopathy  Musculoskeletal: full ROM on all extremities                            10.2   8.4   )-----------( 2        ( 12 Feb 2018 12:45 )             28.0   02-12    135  |  94<L>  |  47<H>  ----------------------------<  115<H>  3.1<L>   |  27  |  2.03<H>    Ca    9.0      12 Feb 2018 12:45    TPro  6.3  /  Alb  3.4  /  TBili  0.4  /  DBili  x   /  AST  45<H>  /  ALT  40  /  AlkPhos  46  02-12

## 2018-02-12 NOTE — ED ADULT NURSE REASSESSMENT NOTE - NS ED NURSE REASSESS COMMENT FT1
T&S x2 sent to blood bank. Blood bank reports that it will take 1hr to get result then able to release platelets. Plan of care has explained to pt and pt verbalized understanding.

## 2018-02-12 NOTE — H&P ADULT - HISTORY OF PRESENT ILLNESS
Patient is a 66 yo female PMHx of hypothyroid, HTN, who was sent to the ED for abnormal lab. Patient was recently discharged from [facility] for possible spinal OM with PICC line for IV vancomycin/Levaquin, heparin flushes. Per [facility]/Chip, the patient was being treated for pansensitive strep viridans.  She presented to today for gum bleeding started Wednesday, spontaneous, multiple episodes, and with epitaxis today. Also noticed to have echymosis on her belly and arm when she received SuBQ injection in the hospital. Never had similar problem like this before. Her PCP reshma her lab (only coag) and advised the patient to go the ED for vitamin K shot. Patient reports chronic abdominal and back pain which has been present since mid January. Otherwise denies fever, chills, lightheadedness, CP, palpitations, SOB, cough, N/V/D, dysuria, hematuria, LE edema.  Patient denies history of bleeding, family history of bleeding disorder, never had children but no past miscarriages or lupus in family.    In the ED, she was afebrile, , BP 96/58, sat 97% on RA. She was noticed to have platelet of 2, hematology was consulted and reviewed the smear, 1 unit of platelet ordered. She was given Xanax 0.5mg and decadron 40mg x1. ICU was consulted and the patient was transferred to [facility] for further management. Patient is a 68 yo female PMHx of hypothyroid, HTN, who was sent to the ED for abnormal lab. Patient was recently discharged from [facility] [place] for possible spinal OM with PICC line for IV vancomycin/Levaquin, heparin flushes. Per [facility] [place]/Chip, the patient was being treated for pansensitive strep viridans.  She presented to today for gum bleeding started Wednesday, spontaneous, multiple episodes, and with epitaxis today. Also noticed to have echymosis on her belly and arm when she received SuBQ injection in the hospital. Never had similar problem like this before. Her PCP reshma her lab (only coag) and advised the patient to go the ED for vitamin K shot. Patient reports chronic abdominal and back pain which has been present since mid January. Otherwise denies fever, chills, lightheadedness, CP, palpitations, SOB, cough, N/V/D, dysuria, hematuria, LE edema.  Patient denies history of bleeding, family history of bleeding disorder, never had children but no past miscarriages or lupus in family.    In the ED, she was afebrile, , BP 96/58, sat 97% on RA. CT head was negative for intracranial bleed. She was noticed to have platelet of 2, hematology was consulted and reviewed the smear, 1 unit of platelet ordered. She was given Xanax 0.5mg and decadron 40mg x1. ICU was consulted and the patient was transferred to [facility] for further management. Patient is a 68 yo female PMHx of hypothyroid, HTN, asthma who was sent to the ED for abnormal lab. Patient was recently discharged from [facility] for possible spinal OM with PICC line for IV vancomycin/Levaquin, heparin flushes. Per [facility]/Chip, the patient was being treated for pansensitive strep viridans.  She presented to today for gum bleeding started Wednesday, spontaneous, multiple episodes, and with epitaxis today. Also noticed to have echymosis on her belly and arm when she received SuBQ injection in the hospital. Never had similar problem like this before. Her PCP reshma her lab (only coag) and advised the patient to go the ED for vitamin K shot. Patient reports chronic abdominal and back pain which has been present since mid January. Otherwise denies fever, chills, lightheadedness, CP, palpitations, SOB, cough, N/V/D, dysuria, hematuria, LE edema.  Patient denies history of bleeding, family history of bleeding disorder, never had children but no past miscarriages or lupus in family.    In the ED, she was afebrile, , BP 96/58, sat 97% on RA. CT head was negative for intracranial bleed. She was noticed to have platelet of 2, hematology was consulted and reviewed the smear, 1 unit of platelet ordered. She was given Xanax 0.5mg and decadron 40mg x1. ICU was consulted and the patient was transferred to [facility] for further management. Patient is a 68 yo female PMHx of hypothyroid, HTN, asthma who was sent to the ED for abnormal lab. Patient was recently discharged from [facility] for spinal OM on imaging but not on biopsy (biopsy after abx) with PICC line for IV vancomycin/Levaquin, heparin flushes. Per [facility]/Chip, the patient was being treated for pansensitive strep viridans.  She presented to today for gum bleeding that started on Wednesday, spontaneous, multiple episodes, and with epistaxis possibly for the past three days but noticed today. Her PCP reshma her lab (only coag) and advised the patient to go the ED for vitamin K shot. Patient reports chronic abdominal and back pain which has been present since mid January. Otherwise denies fever, chills, lightheadedness, CP, palpitations, SOB, cough, N/V/D, dysuria, hematuria, LE edema.  Patient denies history of bleeding, family history of bleeding disorder, never had children but no past miscarriages or lupus in family.    In the ED, she was afebrile, , BP 96/58, sat 97% on RA. CT head was negative for intracranial bleed. She was noticed to have platelet of 2, hematology was consulted and reviewed the smear, 1 unit of platelet ordered. She was given Xanax 0.5mg and decadron 40mg x1. ICU was consulted and the patient was transferred to [facility] for further management.     As per outpatient records: EDER WNL no vegetation, normal LV and RV size and function. Atherosclerotic disease of the aorta. MRI evidence of T11-T12 discitis/osteomyelitis with epidural phlegmon and small abscess, w/out significant cord compression. Possible ileus on 1/26.

## 2018-02-12 NOTE — ED PROVIDER NOTE - CARE PLAN
Principal Discharge DX:	Oral bleeding Principal Discharge DX:	Oral bleeding  Secondary Diagnosis:	Platelets decreased

## 2018-02-12 NOTE — ED PROVIDER NOTE - DIAGNOSIS COUNSELING, MDM
conducted a detailed discussion... I had a detailed discussion with the patient and/or guardian regarding the historical points, exam findings, and any diagnostic results supporting the  admit diagnosis.

## 2018-02-12 NOTE — H&P ADULT - PROBLEM SELECTOR PLAN 3
Unclear etiology, may be 2/2 drug induced ATN given OP vancomycin use. S/p 2L fluid in the ED.  - follow up with UA, urine lytes.  - trend Cr  - avoid nephrotoxic drugs, renally dose meds

## 2018-02-12 NOTE — H&P ADULT - NSHPLABSRESULTS_GEN_ALL_CORE
LABS:                        10.2   8.4   )-----------( 2        ( 12 Feb 2018 12:45 )             28.0     02-12    135  |  94<L>  |  47<H>  ----------------------------<  115<H>  3.1<L>   |  27  |  2.03<H>    Ca    9.0      12 Feb 2018 12:45    TPro  6.3  /  Alb  3.4  /  TBili  0.4  /  DBili  x   /  AST  45<H>  /  ALT  40  /  AlkPhos  46  02-12    PT/INR - ( 12 Feb 2018 12:45 )   PT: 14.1 sec;   INR: 1.26          PTT - ( 12 Feb 2018 12:45 )  PTT:30.6 sec      RADIOLOGY & ADDITIONAL TESTS:  CT Head No Cont (02.12.18 @ 15:25)  Impression: Mild microvascular disease. No evidence of acute intracranial   injury LABS:                        10.2   8.4   )-----------( 2        ( 12 Feb 2018 12:45 )             28.0     02-12    135  |  94<L>  |  47<H>  ----------------------------<  115<H>  3.1<L>   |  27  |  2.03<H>    Ca    9.0      12 Feb 2018 12:45    TPro  6.3  /  Alb  3.4  /  TBili  0.4  /  DBili  x   /  AST  45<H>  /  ALT  40  /  AlkPhos  46  02-12  PT/INR - ( 12 Feb 2018 12:45 )   PT: 14.1 sec;   INR: 1.26     PTT - ( 12 Feb 2018 12:45 )  PTT:30.6 sec    RADIOLOGY & ADDITIONAL TESTS:  CT Head No Cont (02.12.18 @ 15:25)  Impression: Mild microvascular disease. No evidence of acute intracranial   injury

## 2018-02-12 NOTE — CONSULT NOTE ADULT - PROBLEM SELECTOR RECOMMENDATION 3
Neg biopsy per patient, however found to be bacteremic.  Would obtain sensitivity records from [facility], consider alternative antibiotics. Neg biopsy per patient, however found to be bacteremic.  Would obtain sensitivity records from [facility], consider alternative antibiotics to vanc/levaquin.

## 2018-02-12 NOTE — ED SUB INTERN NOTE - LAB INTERPRETATION
CBC Full  -  ( 12 Feb 2018 12:45 )  WBC Count : 8.4 K/uL  Hemoglobin : 10.2 g/dL  Hematocrit : 28.0 %  Platelet Count - Automated : 2 K/uL    PT/INR - ( 12 Feb 2018 12:45 )   PT: 14.1 sec;   INR: 1.26     PTT - ( 12 Feb 2018 12:45 )  PTT:30.6 sec

## 2018-02-12 NOTE — H&P ADULT - PROBLEM SELECTOR PLAN 6
Not in acute exacerbation.  -continue to monitor Holding home antihypertensive in the setting of high risk for hemorrhage.    #HLD  -c/w home lipitor 10mg daily

## 2018-02-12 NOTE — ED SUB INTERN NOTE - MEDICAL DECISION MAKING DETAILS
77 F recently admitted to [facility] found to have spinal/paraspinal infection d/c w/ PICC on Vanco and Levaquin presents w/ abnormal bleeding, including gums, nose and skin (petechiae). Found to have plt of 2. Consulting Hem/Onc for possible HIT, TTP or ITP. Will perform CT head and rectal exam.

## 2018-02-12 NOTE — ED PROVIDER NOTE - MEDICAL DECISION MAKING DETAILS
Pt with oral mucosa bleeding, epistaxis, multiple body ecchymosis since started IV AB and heparin flush, platelets 2K, pt without any hx of cancer, leukemia, bleeding disorders and platelet abnormalities. Pt was consulted by hematology and recommendations to start platelet transfusion if no schistocytes noted, CT head ordered, occult blood sent out, pt will be admitted for further management. Pt with oral mucosa bleeding, epistaxis in the ED, multiple body ecchymosis since started IV AB and heparin flush, platelets 2K, pt without any hx of cancer, leukemia, known bleeding disorders and platelet abnormalities. Hemoccult negative. Pt was consulted by hematology and recommendations to start platelet transfusion if no schistocytes noted on a smear, CT head ordered, occult blood sent out, pt will be admitted for further management. ICU team evaluated the patient, will be admitted to step down unit, hematology recommends administration of steroids due to potential cause of symptoms and signs being drug induced thrombocytopenia. Pt is hemodynamically stable, received a dose of xanax for anxiety that patient takes at home. First dose of platelets administered in the ED.

## 2018-02-12 NOTE — CONSULT NOTE ADULT - SUBJECTIVE AND OBJECTIVE BOX
Hematology Oncology Consult Note (Dr. Dunlap )  Discussed with Dr. Dunlap and recommendations reviewed with the primary team.    The patient was seen and examined    ESTEPHANIE FLORES is a 67y Female with history of HTN, HLD and hypothyroidism who presents with 5 day history of gum bleeding and sites of injection. She was seen at [facility] ED on 1/24 for back pain. At that time, her hb and platelet count were normal. She had an elevated WBC count. Renal function WNL. She was discharged on analgesics however back pain worsened and she presented to [facility] few days later. She was subsequently found to have a T11-T12 osteomyelitis with abscess collection. She was started on IV Vancomycin and Levaquin. She was discharged on 2/5 with instruction to have IV antibiotics as an outpatient. Of note, she was on a SQ blood thinner likely heparin or lovenox during that admission. No thrombocytopenia on discharge. She says she has never had thrombocytopenia in the past.     Gum bleeding began on 2/8. She initially thought gum bleeding was due to brushing her teeth but it continued to persist throughout weekend. She also noted bruising in sites of blood draws as well as where she SQ heparin during prior admission. She called her PCP Dr. Moise Hayden who had coags drawn on 2/10. Coags showed slightly prolonged INR and he recommended to come into ER to have IV Vitamin K x 1 dose. She denies hemarthroses. No BRBPR or melena. Per ED team, she had negative guiac. No hematuria.     No fevers/chills or night sweats. She notes weight loss due to reduced appetite for 1 week but no unintentional weight loss over the past few months. No leg pain or swelling.    In the ED, she had 1 episode of epistaxis.         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 No children  No smoking or hx of EtOH abuse    FAMILY HISTORY:  No pertinent family history in first degree relatives      PHYSICAL EXAM:    T(F): 97.5 (02-12-18 @ 16:20), Max: 97.6 (02-12-18 @ 14:49)  HR: 92 (02-12-18 @ 16:20) (88 - 111)  BP: 103/63 (02-12-18 @ 16:20) (96/58 - 103/63)  RR: 18 (02-12-18 @ 16:20) (18 - 18)  SpO2: 97% (02-12-18 @ 16:20) (96% - 98%)  Wt(kg): --    Daily     Daily     Gen: well developed, well nourished  HEENT: NCAT, No conjunctival pallor, no scleral icterus. Dried up blood noted on lips and on gum as well as fresh blood in gum. Petechiae noted in the orophraynx  Neck: supple, no JVD  Lymph nodes: No cervical, axillary or inguinal adenopathy  Cardiovascular: RR, nl S1S2, no murmurs/rubs/gallops  Respiratory: clear air entry b/l  Gastrointestinal: BS+, soft, NT/ND, no masses, no splenomegaly, no hepatomegaly, no evidence for ascites  Extremities: no clubbing/cyanosis, no edema, no calf tenderness  Vascular:  DP/PT 2+ b/l  Neurological: CN 2-12 grossly intact, no focal deficits  Skin: no rash however ecchymoses noted bilaterally in upper extremities   BacK: no petechiae noted in lower back however ecchymoses noted on buttocks        Labs:                          10.2   8.4   )-----------( 2        ( 12 Feb 2018 12:45 )             28.0     CBC Full  -  ( 12 Feb 2018 12:45 )  WBC Count : 8.4 K/uL  Hemoglobin : 10.2 g/dL  Hematocrit : 28.0 %  Platelet Count - Automated : 2 K/uL  Mean Cell Volume : 85.4 fL  Mean Cell Hemoglobin : 31.1 pg  Mean Cell Hemoglobin Concentration : 36.4 g/dL  Auto Neutrophil # : x  Auto Lymphocyte # : x  Auto Monocyte # : x  Auto Eosinophil # : x  Auto Basophil # : x  Auto Neutrophil % : 57.0 %  Auto Lymphocyte % : 15.0 %  Auto Monocyte % : 12.0 %  Auto Eosinophil % : 6.0 %  Auto Basophil % : 2.0 %    PT/INR - ( 12 Feb 2018 12:45 )   PT: 14.1 sec;   INR: 1.26          PTT - ( 12 Feb 2018 12:45 )  PTT:30.6 sec    02-12    135  |  94<L>  |  47<H>  ----------------------------<  115<H>  3.1<L>   |  27  |  2.03<H>    Ca    9.0      12 Feb 2018 12:45    TPro  6.3  /  Alb  3.4  /  TBili  0.4  /  DBili  x   /  AST  45<H>  /  ALT  40  /  AlkPhos  46  02-12          Other Labs:    Cultures:    Pathology:    Imaging Studies: Hematology Oncology Consult Note (Dr. Dunlap )  Discussed with Dr. Dunlap and recommendations reviewed with the primary team.    The patient was seen and examined    ESTEPHANIE FLORES is a 67y Female with history of HTN, HLD and hypothyroidism who presents with 5 day history of gum bleeding and sites of injection. She was seen at [facility] ED on 1/24 for back pain. At that time, her hb and platelet count were normal. She had an elevated WBC count. Renal function WNL. She was discharged on analgesics however back pain worsened and she presented to [facility] few days later. She was subsequently found to have a T11-T12 osteomyelitis with abscess collection. She was started on IV Vancomycin and Levaquin. She was discharged on 2/5 with PICC line and to continue IV abx as outpatient. Of note, she was on a SQ blood thinner likely heparin or lovenox during that admission. No thrombocytopenia on discharge. She says she has never had thrombocytopenia in the past.     Gum bleeding began on 2/8. She initially thought gum bleeding was due to brushing her teeth but it continued to persist throughout weekend. She also noted bruising in sites of blood draws as well as where she SQ heparin during prior admission. She called her PCP Dr. Moise Hayden who had coags drawn on 2/10. Coags showed slightly prolonged INR and he recommended to come into ER to have IV Vitamin K x 1 dose. She denies hemarthroses. No BRBPR or melena. Per ED team, she had negative guiac. No hematuria.     No fevers/chills or night sweats. She notes weight loss due to reduced appetite for 1 week but no unintentional weight loss over the past few months. No leg pain or swelling.    In the ED, she had 1 episode of epistaxis.         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 No children  No smoking or hx of EtOH abuse    FAMILY HISTORY:  No pertinent family history in first degree relatives      PHYSICAL EXAM:    T(F): 97.5 (02-12-18 @ 16:20), Max: 97.6 (02-12-18 @ 14:49)  HR: 92 (02-12-18 @ 16:20) (88 - 111)  BP: 103/63 (02-12-18 @ 16:20) (96/58 - 103/63)  RR: 18 (02-12-18 @ 16:20) (18 - 18)  SpO2: 97% (02-12-18 @ 16:20) (96% - 98%)  Wt(kg): --    Daily     Daily     Gen: well developed, well nourished  HEENT: NCAT, No conjunctival pallor, no scleral icterus. Dried up blood noted on lips and on gum as well as fresh blood in gum. Petechiae noted in the orophraynx  Neck: supple, no JVD  Lymph nodes: No cervical, axillary or inguinal adenopathy  Cardiovascular: RR, nl S1S2, no murmurs/rubs/gallops  Respiratory: clear air entry b/l  Gastrointestinal: BS+, soft, NT/ND, no masses, no splenomegaly, no hepatomegaly, no evidence for ascites  Extremities: no clubbing/cyanosis, no edema, no calf tenderness  Vascular:  DP/PT 2+ b/l  Neurological: CN 2-12 grossly intact, no focal deficits  Skin: no rash however ecchymoses noted bilaterally in upper extremities   BacK: no petechiae noted in lower back however ecchymoses noted on buttocks        Labs:                          10.2   8.4   )-----------( 2        ( 12 Feb 2018 12:45 )             28.0     CBC Full  -  ( 12 Feb 2018 12:45 )  WBC Count : 8.4 K/uL  Hemoglobin : 10.2 g/dL  Hematocrit : 28.0 %  Platelet Count - Automated : 2 K/uL  Mean Cell Volume : 85.4 fL  Mean Cell Hemoglobin : 31.1 pg  Mean Cell Hemoglobin Concentration : 36.4 g/dL  Auto Neutrophil # : x  Auto Lymphocyte # : x  Auto Monocyte # : x  Auto Eosinophil # : x  Auto Basophil # : x  Auto Neutrophil % : 57.0 %  Auto Lymphocyte % : 15.0 %  Auto Monocyte % : 12.0 %  Auto Eosinophil % : 6.0 %  Auto Basophil % : 2.0 %    PT/INR - ( 12 Feb 2018 12:45 )   PT: 14.1 sec;   INR: 1.26          PTT - ( 12 Feb 2018 12:45 )  PTT:30.6 sec    02-12    135  |  94<L>  |  47<H>  ----------------------------<  115<H>  3.1<L>   |  27  |  2.03<H>    Ca    9.0      12 Feb 2018 12:45    TPro  6.3  /  Alb  3.4  /  TBili  0.4  /  DBili  x   /  AST  45<H>  /  ALT  40  /  AlkPhos  46  02-12          Other Labs:    Cultures:    Pathology:    Imaging Studies:

## 2018-02-12 NOTE — ED PROVIDER NOTE - MOUTH
ecchymosis at the lips and oral mucosa/DRY [employer]UC[employer]ANES ecchymosis on the lips and oral mucosa/[employer]Y [employer]UC[employer] MEM[employer]ANES

## 2018-02-12 NOTE — ED SUB INTERN NOTE - OBJECTIVE STATEMENT FT
67 F with PMhx of asthma presents with blistering of mouth and bleeding from gums and nose. Pt was recently discharged from [facility] ED on 1/23/18 for a back pain radiating to abdomen and was subsequently hospitalized at [facility]. Found to have infection in or near spine, tx with Vanco/Levaquin. Pt has a PICC line and receives heparin flush for Vanc tx. Unclear whether the patient has ever been exposed to heparin previously. Pt had nose bleed last Wednesday that resolved after 15 mins and then noticed blood pooling in her mouth the next morning. On Thursday she also noticed her mouth was blistering and her gums were bleeding. Pt denies any bleeding conditions, denies hx of cancer, denies hematemesis and denies hematochezia or melena (pt has not had a bowel movement since starting opiates for back pain last week). Denies fever, chills, difficulty breathing. Endorses nausea, vomiting, some chest tightness of several months, back pain.

## 2018-02-12 NOTE — ED PROVIDER NOTE - OBJECTIVE STATEMENT
Pt 66 yo female was sent by her PCP for evaluation of oral bleeding while brushing her teeth, recent hospitalization for spinal infection, on IV AB with heparin flushes, no hx of bleeding disorders, denies black stool BRBPR, bruising noted on UEs, no hx of trauma, falls, normal hematology blood work in the hospital. pt denies any headache, any sensory deficits or weakness of the extremities. Pt 66 yo female was sent by her PCP for evaluation of her spontaneous oral bleeding while brushing her teeth. Pt had a recent hospitalization for spinal infection, currently on IV AB with heparin flushes, no hx of bleeding disorders, denies black stool or BRBPR, bruising noted on UEs, had a vomiting episode at home, light color vomitus, no hemoptysis, no hx of trauma, falls, normal hematology blood work in the hospital. Pt denies any headache, dizziness, any sensory deficits or weakness of the extremities, chest pain, shortness of breath.

## 2018-02-12 NOTE — H&P ADULT - PROBLEM SELECTOR PLAN 2
Per patient, biopsy was negative at previous hospitalization, however found to be bacteremic. Per [facility], patient bacteremic with pan-sensitive strep viridans but discharged on Levaquin and vancomycin.  -f/u record [place] Per patient, biopsy was negative at previous hospitalization, however found to be bacteremic. Per [facility], patient bacteremic with pan-sensitive strep viridans but discharged on Levaquin and vancomycin 2/2 esr decrease on these abx  -[facility] record in chart  - Hold off on further abx at this time, will restart if febrile but unclear cause of thrombocytopenia

## 2018-02-12 NOTE — ED ADULT TRIAGE NOTE - CHIEF COMPLAINT QUOTE
as per spouse "our doctor told her to come in because she had blood drawn and they said she needs a vitamin K injection. She's been having bleeding from the mouth."

## 2018-02-12 NOTE — H&P ADULT - PROBLEM SELECTOR PLAN 1
Presenting with platelet count of 2k with bleeding gums and diffuse pettechial rash. Possibly HIT (T score of 5), platelet severely low for ITP, and TTP is possible but less likely (no schistocytes, no fever, no neurologic symptoms). Also possible 2/2 to drug induced (vancomycin/levaquin). Patient was bacteremic so DIC is also possible.  Heme consulted by ED, will f/u with recs.  Would get fibrinogen and D-dimer.  Due to active bleeding, patient will need platelet, will f/u with level after transfusion.  Anemia work up negative for hemolysis, no active sign of GI bleed, no external sign of bleeding on physical exam, will trend CBC, active type and screen.  Would recommend HIT panel, [id number].  Avoid further heparin. Presenting with platelet count of 2k with bleeding gums and diffuse petechial rash. Likely 2/2 drug induced d/t vancomycin, less likely HIT (T score of 5), ITP (though platelets are severely low), and TTP is possible but less likely (no schistocytes, no fever, no neurologic symptoms).  -Heme consulted by ED, f/u recs  -f/u fibrinogen, D-dimer, TSH  - Patient has active bleeding, ordered for platelet transfusion. Will repeat CBC 1 hour following administration to eval for consumptive coagulopathy   -Anemia work up negative for hemolysis, no active sign of GI bleed, no external sign of bleeding on physical exam, will trend CBC, active type and screen.  Would recommend HIT panel, [id number].  Avoid further heparin. Presenting with platelet count of 2k with bleeding gums and diffuse petechial rash. Likely 2/2 drug induced d/t vancomycin, less likely HIT (T score of 5), ITP (though platelets are severely low), and TTP is possible but less likely (no schistocytes, no fever, no neurologic symptoms).  -Heme consulted by ED, f/u recs  -f/u fibrinogen, D-dimer, TSH, [id number], serotonin assay   -Patient has active bleeding, ordered for platelet transfusion. Will repeat CBC 1 hour following administration to eval for consumptive coagulopathy   -will hold off on further heparin Presenting with platelet count of 2k with bleeding gums and diffuse petechial rash. Likely 2/2 drug induced d/t vancomycin, less likely HIT (T score of 5), ITP (though platelets are severely low), and TTP is possible but less likely (no schistocytes, no fever, no neurologic symptoms).  -Heme consulted by ED, f/u recs  -f/u fibrinogen, D-dimer, TSH, [id number], serotonin assay   -Patient has active bleeding, ordered for platelet transfusion. Will repeat CBC 1 hour following administration to eval for consumptive coagulopathy   -c/w decadron 40mg for 3 more days  -start IVIG at half dose, 35mg over 6 hours for renal impairment  -will hold off on further heparin Presenting with platelet count of 2k with bleeding gums and diffuse petechial rash. Likely 2/2 drug induced d/t vancomycin, less likely HIT (T score of 5), ITP (though platelets are severely low), and TTP is possible but less likely (no schistocytes, no fever, no neurologic symptoms).  -Heme consulted by ED, f/u recs  -f/u fibrinogen, D-dimer, TSH, [id number], serotonin assay   -Patient has active bleeding, ordered for platelet transfusion. Will repeat CBC 1 hour following administration to eval for consumptive coagulopathy   -c/w decadron 40mg for 3 more days  -start IVIG at half dose, 35mg over 6 hours for renal impairment  -will hold off on further heparin, SCDs

## 2018-02-12 NOTE — CONSULT NOTE ADULT - ASSESSMENT
67 year old F who presents with 5 day history of gum bleeding and bruising in upper extremities and on further workup is found to have severe thrombocytopenia with platelet count of 2K. 67 year old F who presents with 5 day history of gum bleeding and bruising in upper extremities and on further workup is found to have severe thrombocytopenia with platelet count of 2K. No CNS bleed or active bleeding episodes however pt having gum bleeding and epistaxis. Differential diagnosis likely drug-induced ITP however will r/o HIT and TTP.    Peripheral smear reviewed:   RBC's: numerous micro-spherocytes, no shistocytes.   Platelets: NO platelets noted on peripheral smear; no evidence of clumping  WBC's: Pseudo-Pelger-Huet cells noted, no blasts. No toxic granulation    #Thrombocytopenia  Degree of thrombocytopenia severe. Given degree of thrombocytopenia, etiology is usually ITP (can be drug induced) vs TTP. 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 Vancomycin is a well known culprit. No evidence of shistocytes on peripheral smear and hemoylsis labs do not indicate a hemolytic anemia and therefore TTP is less likely. Given recent heparin exposure on hospital admission last week, HIT is within differential but less likely as patients w/ HIT rarely ever have plt count < 20K.   -Recommend d/c of vancomycin  -Decadron 40 mg IV given already, will continue w/ 3 more days of therapy  -Transfuse 1 unit of platelets given risk of spontaneous CNS bleed in patients with plt count < 10K (CT head negative at baseline); please check post-transfusion platelet count 1 hour after transfusion is completed to evaluate for platelet refractoriness  -We recommend starting patient on concomitant IVIG therapy; given renal dysfunction, we will start her on 0.5 g/kg instead of standard 1 g/kg. Please infuse 35 g of Gammagard (IVIG) after completion of platelet transfusion. We will do this therapy 2 consecutive days. Please infuse IVIG over 6 hours per pharmacy to decrease risk of renal failure.   -f/u HIT and ALYCIA panel  -f/u [id number] (likely to be neg)    #Anemia  Normocytic and new-onset. Pt had no underlying anemia on labs on 1/24. No evidence of tobi hemolysis given elevated hapto, normal retic and bili and no shistocytes noted  -Recommend checking Direct Nieves panel (pt may have autoimmune hemolytic anemia secondary to vanco); spherocytes can be seen in immune hemolysis from drugs.   -Iron studies, B12, and folate to evaluate for reversible causes   -If Hb < 7 g/dL or pt symptomatic, recommend transfusion of pRBC    Case discussed w/ Dr. Dunlap

## 2018-02-12 NOTE — H&P ADULT - PROBLEM SELECTOR PLAN 5
Holding home antihypertensive in the setting of high risk for hemorrhage.    #HLD  -c/w home lipitor 10mg daily Patient constipated for past 5 days, no BM, passing gas likely opiate induced ileus (Oxycontin 20 BID and oxycodone 10 PRN). Patient has not been able to tolerate PO but has been hungry. Has tried docusate and senna, has vomited up miralax. Abdomen benign on exam but dull to percussion bilateral lower quadrants  - Attempt lactulose, will avoid enemas while patient is thrombocytopenic   - F/u abd xray

## 2018-02-12 NOTE — H&P ADULT - PROBLEM SELECTOR PLAN 4
Anemia work up negative for hemolysis, no active sign of GI bleed, no external sign of bleeding on physical exam.  - trend CBC  - maintain active T&S  - transfuse for Hgb <7 Anemia work up negative for hemolysis, no active sign of GI bleed, no external sign of bleeding on physical exam.  -f/u iron studies, folate, b12  - trend CBC  - maintain active T&S  - transfuse for Hgb <7 Anemia work up negative for hemolysis, no active sign of GI bleed, no external sign of bleeding on physical exam.  -f/u iron studies, b12, folate as per heme onc  - trend CBC  - maintain active T&S  - transfuse for Hgb <7

## 2018-02-12 NOTE — CONSULT NOTE ADULT - ASSESSMENT
68 yo female pmh of hypothyroid, HTN, who was sent to the ED for spontaneous gum bleeding, found to have platelet of 2k.

## 2018-02-13 DIAGNOSIS — R78.81 BACTEREMIA: ICD-10-CM

## 2018-02-13 LAB
ALBUMIN SERPL ELPH-MCNC: 3.4 G/DL — SIGNIFICANT CHANGE UP (ref 3.3–5)
ALP SERPL-CCNC: 46 U/L — SIGNIFICANT CHANGE UP (ref 40–120)
ALT FLD-CCNC: 34 U/L — SIGNIFICANT CHANGE UP (ref 10–45)
ANION GAP SERPL CALC-SCNC: 13 MMOL/L — SIGNIFICANT CHANGE UP (ref 5–17)
ANION GAP SERPL CALC-SCNC: 14 MMOL/L — SIGNIFICANT CHANGE UP (ref 5–17)
ANION GAP SERPL CALC-SCNC: 15 MMOL/L — SIGNIFICANT CHANGE UP (ref 5–17)
APTT BLD: 31.2 SEC — SIGNIFICANT CHANGE UP (ref 27.5–37.4)
AST SERPL-CCNC: 37 U/L — SIGNIFICANT CHANGE UP (ref 10–40)
BASOPHILS NFR BLD AUTO: 0.2 % — SIGNIFICANT CHANGE UP (ref 0–2)
BILIRUB SERPL-MCNC: 0.4 MG/DL — SIGNIFICANT CHANGE UP (ref 0.2–1.2)
BUN SERPL-MCNC: 50 MG/DL — HIGH (ref 7–23)
BUN SERPL-MCNC: 51 MG/DL — HIGH (ref 7–23)
BUN SERPL-MCNC: 51 MG/DL — HIGH (ref 7–23)
CALCIUM SERPL-MCNC: 9 MG/DL — SIGNIFICANT CHANGE UP (ref 8.4–10.5)
CALCIUM SERPL-MCNC: 9.1 MG/DL — SIGNIFICANT CHANGE UP (ref 8.4–10.5)
CALCIUM SERPL-MCNC: 9.4 MG/DL — SIGNIFICANT CHANGE UP (ref 8.4–10.5)
CHLORIDE SERPL-SCNC: 93 MMOL/L — LOW (ref 96–108)
CHLORIDE SERPL-SCNC: 94 MMOL/L — LOW (ref 96–108)
CHLORIDE SERPL-SCNC: 97 MMOL/L — SIGNIFICANT CHANGE UP (ref 96–108)
CK SERPL-CCNC: 301 U/L — HIGH (ref 25–170)
CO2 SERPL-SCNC: 26 MMOL/L — SIGNIFICANT CHANGE UP (ref 22–31)
CO2 SERPL-SCNC: 27 MMOL/L — SIGNIFICANT CHANGE UP (ref 22–31)
CO2 SERPL-SCNC: 27 MMOL/L — SIGNIFICANT CHANGE UP (ref 22–31)
CREAT SERPL-MCNC: 1.57 MG/DL — HIGH (ref 0.5–1.3)
CREAT SERPL-MCNC: 1.7 MG/DL — HIGH (ref 0.5–1.3)
CREAT SERPL-MCNC: 1.87 MG/DL — HIGH (ref 0.5–1.3)
DAT POLY-SP REAG RBC QL: NEGATIVE — SIGNIFICANT CHANGE UP
EOSINOPHIL NFR BLD AUTO: 0.4 % — SIGNIFICANT CHANGE UP (ref 0–6)
FERRITIN SERPL-MCNC: 829.7 NG/ML — HIGH (ref 15–150)
FIBRINOGEN PPP-MCNC: 477 MG/DL — HIGH (ref 258–438)
FOLATE SERPL-MCNC: 14.5 NG/ML — SIGNIFICANT CHANGE UP (ref 4.8–24.2)
GLUCOSE SERPL-MCNC: 140 MG/DL — HIGH (ref 70–99)
GLUCOSE SERPL-MCNC: 143 MG/DL — HIGH (ref 70–99)
GLUCOSE SERPL-MCNC: 175 MG/DL — HIGH (ref 70–99)
HAPTOGLOB SERPL-MCNC: 277 MG/DL — HIGH (ref 34–200)
HCT VFR BLD CALC: 23 % — LOW (ref 34.5–45)
HCT VFR BLD CALC: 23.7 % — LOW (ref 34.5–45)
HCT VFR BLD CALC: 24.5 % — LOW (ref 34.5–45)
HGB BLD-MCNC: 7.9 G/DL — LOW (ref 11.5–15.5)
HGB BLD-MCNC: 8.3 G/DL — LOW (ref 11.5–15.5)
HGB BLD-MCNC: 8.4 G/DL — LOW (ref 11.5–15.5)
INR BLD: 1.21 — HIGH (ref 0.88–1.16)
IRON SATN MFR SERPL: 46 % — SIGNIFICANT CHANGE UP (ref 14–50)
IRON SATN MFR SERPL: 95 UG/DL — SIGNIFICANT CHANGE UP (ref 30–160)
LDH SERPL L TO P-CCNC: 288 U/L — HIGH (ref 50–242)
LYMPHOCYTES # BLD AUTO: 12.5 % — LOW (ref 13–44)
MAGNESIUM SERPL-MCNC: 2.1 MG/DL — SIGNIFICANT CHANGE UP (ref 1.6–2.6)
MCHC RBC-ENTMCNC: 29.8 PG — SIGNIFICANT CHANGE UP (ref 27–34)
MCHC RBC-ENTMCNC: 29.9 PG — SIGNIFICANT CHANGE UP (ref 27–34)
MCHC RBC-ENTMCNC: 30.2 PG — SIGNIFICANT CHANGE UP (ref 27–34)
MCHC RBC-ENTMCNC: 34.3 G/DL — SIGNIFICANT CHANGE UP (ref 32–36)
MCHC RBC-ENTMCNC: 34.3 G/DL — SIGNIFICANT CHANGE UP (ref 32–36)
MCHC RBC-ENTMCNC: 35 G/DL — SIGNIFICANT CHANGE UP (ref 32–36)
MCV RBC AUTO: 85.3 FL — SIGNIFICANT CHANGE UP (ref 80–100)
MCV RBC AUTO: 86.9 FL — SIGNIFICANT CHANGE UP (ref 80–100)
MCV RBC AUTO: 87.8 FL — SIGNIFICANT CHANGE UP (ref 80–100)
MONOCYTES NFR BLD AUTO: 11.9 % — SIGNIFICANT CHANGE UP (ref 2–14)
NEUTROPHILS NFR BLD AUTO: 75 % — SIGNIFICANT CHANGE UP (ref 43–77)
PHOSPHATE SERPL-MCNC: 3.7 MG/DL — SIGNIFICANT CHANGE UP (ref 2.5–4.5)
PLATELET # BLD AUTO: 2 K/UL — CRITICAL LOW (ref 150–400)
PLATELET # BLD AUTO: 2 K/UL — CRITICAL LOW (ref 150–400)
PLATELET # BLD AUTO: 41 K/UL — LOW (ref 150–400)
POTASSIUM SERPL-MCNC: 3.4 MMOL/L — LOW (ref 3.5–5.3)
POTASSIUM SERPL-MCNC: 3.4 MMOL/L — LOW (ref 3.5–5.3)
POTASSIUM SERPL-MCNC: 3.8 MMOL/L — SIGNIFICANT CHANGE UP (ref 3.5–5.3)
POTASSIUM SERPL-SCNC: 3.4 MMOL/L — LOW (ref 3.5–5.3)
POTASSIUM SERPL-SCNC: 3.4 MMOL/L — LOW (ref 3.5–5.3)
POTASSIUM SERPL-SCNC: 3.8 MMOL/L — SIGNIFICANT CHANGE UP (ref 3.5–5.3)
PROT SERPL-MCNC: 6.2 G/DL — SIGNIFICANT CHANGE UP (ref 6–8.3)
PROTHROM AB SERPL-ACNC: 13.5 SEC — HIGH (ref 9.8–12.7)
RBC # BLD: 2.62 M/UL — LOW (ref 3.8–5.2)
RBC # BLD: 2.78 M/UL — LOW (ref 3.8–5.2)
RBC # BLD: 2.82 M/UL — LOW (ref 3.8–5.2)
RBC # FLD: 12.4 % — SIGNIFICANT CHANGE UP (ref 10.3–16.9)
RBC # FLD: 12.5 % — SIGNIFICANT CHANGE UP (ref 10.3–16.9)
RBC # FLD: 13 % — SIGNIFICANT CHANGE UP (ref 10.3–16.9)
SODIUM SERPL-SCNC: 134 MMOL/L — LOW (ref 135–145)
SODIUM SERPL-SCNC: 136 MMOL/L — SIGNIFICANT CHANGE UP (ref 135–145)
SODIUM SERPL-SCNC: 136 MMOL/L — SIGNIFICANT CHANGE UP (ref 135–145)
TIBC SERPL-MCNC: 208 UG/DL — LOW (ref 220–430)
TSH SERPL-MCNC: 0.34 UIU/ML — LOW (ref 0.35–4.94)
UIBC SERPL-MCNC: 113 UG/DL — SIGNIFICANT CHANGE UP (ref 110–370)
VANCOMYCIN TROUGH SERPL-MCNC: 22 UG/ML — HIGH (ref 10–20)
VIT B12 SERPL-MCNC: 855 PG/ML — SIGNIFICANT CHANGE UP (ref 232–1245)
WBC # BLD: 4.7 K/UL — SIGNIFICANT CHANGE UP (ref 3.8–10.5)
WBC # BLD: 4.9 K/UL — SIGNIFICANT CHANGE UP (ref 3.8–10.5)
WBC # BLD: 7 K/UL — SIGNIFICANT CHANGE UP (ref 3.8–10.5)
WBC # FLD AUTO: 4.7 K/UL — SIGNIFICANT CHANGE UP (ref 3.8–10.5)
WBC # FLD AUTO: 4.9 K/UL — SIGNIFICANT CHANGE UP (ref 3.8–10.5)
WBC # FLD AUTO: 7 K/UL — SIGNIFICANT CHANGE UP (ref 3.8–10.5)

## 2018-02-13 PROCEDURE — 99222 1ST HOSP IP/OBS MODERATE 55: CPT | Mod: GC

## 2018-02-13 PROCEDURE — 99223 1ST HOSP IP/OBS HIGH 75: CPT | Mod: GC

## 2018-02-13 RX ORDER — OXYMETAZOLINE HYDROCHLORIDE 0.5 MG/ML
2 SPRAY NASAL ONCE
Qty: 0 | Refills: 0 | Status: COMPLETED | OUTPATIENT
Start: 2018-02-13 | End: 2018-02-13

## 2018-02-13 RX ORDER — LEVOTHYROXINE SODIUM 125 MCG
100 TABLET ORAL DAILY
Qty: 0 | Refills: 0 | Status: DISCONTINUED | OUTPATIENT
Start: 2018-02-14 | End: 2018-02-16

## 2018-02-13 RX ORDER — CEFTRIAXONE 500 MG/1
INJECTION, POWDER, FOR SOLUTION INTRAMUSCULAR; INTRAVENOUS
Qty: 0 | Refills: 0 | Status: DISCONTINUED | OUTPATIENT
Start: 2018-02-13 | End: 2018-02-13

## 2018-02-13 RX ORDER — ATORVASTATIN CALCIUM 80 MG/1
0 TABLET, FILM COATED ORAL
Qty: 0 | Refills: 0 | COMMUNITY

## 2018-02-13 RX ORDER — CEFTRIAXONE 500 MG/1
2 INJECTION, POWDER, FOR SOLUTION INTRAMUSCULAR; INTRAVENOUS EVERY 24 HOURS
Qty: 0 | Refills: 0 | Status: DISCONTINUED | OUTPATIENT
Start: 2018-02-13 | End: 2018-02-13

## 2018-02-13 RX ORDER — DULOXETINE HYDROCHLORIDE 30 MG/1
60 CAPSULE, DELAYED RELEASE ORAL DAILY
Qty: 0 | Refills: 0 | Status: DISCONTINUED | OUTPATIENT
Start: 2018-02-13 | End: 2018-02-16

## 2018-02-13 RX ORDER — HYDROCHLOROTHIAZIDE 25 MG
0 TABLET ORAL
Qty: 0 | Refills: 0 | COMMUNITY

## 2018-02-13 RX ORDER — CEFTRIAXONE 500 MG/1
2000 INJECTION, POWDER, FOR SOLUTION INTRAMUSCULAR; INTRAVENOUS EVERY 24 HOURS
Qty: 0 | Refills: 0 | Status: DISCONTINUED | OUTPATIENT
Start: 2018-02-13 | End: 2018-02-16

## 2018-02-13 RX ORDER — POTASSIUM CHLORIDE 20 MEQ
40 PACKET (EA) ORAL ONCE
Qty: 0 | Refills: 0 | Status: COMPLETED | OUTPATIENT
Start: 2018-02-13 | End: 2018-02-13

## 2018-02-13 RX ORDER — ACETAMINOPHEN 500 MG
650 TABLET ORAL ONCE
Qty: 0 | Refills: 0 | Status: COMPLETED | OUTPATIENT
Start: 2018-02-13 | End: 2018-02-13

## 2018-02-13 RX ORDER — LEVOTHYROXINE SODIUM 125 MCG
1 TABLET ORAL
Qty: 0 | Refills: 0 | COMMUNITY

## 2018-02-13 RX ORDER — LEVOTHYROXINE SODIUM 125 MCG
88 TABLET ORAL ONCE
Qty: 0 | Refills: 0 | Status: DISCONTINUED | OUTPATIENT
Start: 2018-02-13 | End: 2018-02-13

## 2018-02-13 RX ORDER — ZOLPIDEM TARTRATE 10 MG/1
1 TABLET ORAL
Qty: 0 | Refills: 0 | COMMUNITY

## 2018-02-13 RX ORDER — FLUTICASONE FUROATE AND VILANTEROL TRIFENATATE 100; 25 UG/1; UG/1
1 POWDER RESPIRATORY (INHALATION)
Qty: 0 | Refills: 0 | COMMUNITY

## 2018-02-13 RX ORDER — BUDESONIDE AND FORMOTEROL FUMARATE DIHYDRATE 160; 4.5 UG/1; UG/1
2 AEROSOL RESPIRATORY (INHALATION)
Qty: 0 | Refills: 0 | Status: DISCONTINUED | OUTPATIENT
Start: 2018-02-13 | End: 2018-02-13

## 2018-02-13 RX ORDER — DULOXETINE HYDROCHLORIDE 30 MG/1
1 CAPSULE, DELAYED RELEASE ORAL
Qty: 0 | Refills: 0 | COMMUNITY

## 2018-02-13 RX ORDER — DEXAMETHASONE 0.5 MG/5ML
40 ELIXIR ORAL DAILY
Qty: 0 | Refills: 0 | Status: COMPLETED | OUTPATIENT
Start: 2018-02-14 | End: 2018-02-15

## 2018-02-13 RX ORDER — DEXTROAMPHETAMINE SACCHARATE, AMPHETAMINE ASPARTATE, DEXTROAMPHETAMINE SULFATE AND AMPHETAMINE SULFATE 1.875; 1.875; 1.875; 1.875 MG/1; MG/1; MG/1; MG/1
20 TABLET ORAL
Qty: 0 | Refills: 0 | COMMUNITY

## 2018-02-13 RX ORDER — IMMUNE GLOBULIN,GAMMA(IGG) 5 %
35 VIAL (ML) INTRAVENOUS ONCE
Qty: 0 | Refills: 0 | Status: COMPLETED | OUTPATIENT
Start: 2018-02-14 | End: 2018-02-14

## 2018-02-13 RX ADMIN — Medication 40 MILLIGRAM(S): at 06:15

## 2018-02-13 RX ADMIN — CEFTRIAXONE 2000 MILLIGRAM(S): 500 INJECTION, POWDER, FOR SOLUTION INTRAMUSCULAR; INTRAVENOUS at 15:59

## 2018-02-13 RX ADMIN — Medication 40 MILLIEQUIVALENT(S): at 04:03

## 2018-02-13 RX ADMIN — Medication 650 MILLIGRAM(S): at 15:59

## 2018-02-13 RX ADMIN — OXYMETAZOLINE HYDROCHLORIDE 2 SPRAY(S): 0.5 SPRAY NASAL at 22:23

## 2018-02-13 RX ADMIN — Medication 650 MILLIGRAM(S): at 17:43

## 2018-02-13 RX ADMIN — Medication 58.33 GRAM(S): at 06:17

## 2018-02-13 RX ADMIN — DULOXETINE HYDROCHLORIDE 60 MILLIGRAM(S): 30 CAPSULE, DELAYED RELEASE ORAL at 13:15

## 2018-02-13 RX ADMIN — Medication 40 MILLIEQUIVALENT(S): at 06:28

## 2018-02-13 RX ADMIN — Medication 12.5 MICROGRAM(S): at 07:10

## 2018-02-13 NOTE — PROGRESS NOTE ADULT - ATTENDING COMMENTS
Pt awake and alert and in no distress. no bleeding noted this AM and Hb and platelet count to be repeated this afternoon post IVIG and on high dose Decadron per Heme. Pt on fall precautions. Vanco level obtained and renal consulted. May need Hd for Vanco removal if level elevated and platelets not coming up on current therapy. Placing a catheter will be a challenge from a bleeding risk. ID consult called as pt needs continued treatment of recent Strep bacteremia and osteo.

## 2018-02-13 NOTE — CONSULT NOTE ADULT - CONSULT REASON
Spinal osteomyelitis and possible drug-induce thrombocytopenia
Thrombocytopenia
Thrombocytopenia
WIN

## 2018-02-13 NOTE — CONSULT NOTE ADULT - PROBLEM SELECTOR RECOMMENDATION 9
Possibly HIT (T score of 5), platelet severely low for ITP, and TTP is possible but less likely (no schistocytes, no fever, no neurologic symptoms). Also possible 2/2 to drug induced (vancomycin/levaquin). Patient was bacteremic so DIC is also possible.  Heme consulted by ED, will f/u with recs.  Would get fibrinogen and D-dimer.  Due to active bleeding, patient will need platelet, will f/u with level after transfusion.  Anemia work up negative for hemolysis, no active sign of GI bleed, no external sign of bleeding on physical exam, will trend CBC, active type and screen.  Would recommend HIT panel, [id number].  Avoid further heparin.  Will discuss with attending for dispo
- presents with Cr 2.03 (baseline 0.69 on 1/24/18) - differential includes pre-renal secondary to dehydration/vomiting vs intrinsic secondary to drug toxicity 2/2 elevated vancomycin level; less likely post-renal/obstructive as making good urine  - Cr improving to 1.57 today, making good urine   - FENa 0.4% and BUN:Cr 50:1 more consistent with pre-renal process   - please obtain U/A to evaluate for any evidence of protein, WBC, RBCs, or casts  - given elevated vancomycin level and concern for ITP related vancomycin - please check differential to evaluate for eosinophils to evaluate for AIN   - encourage PO hydration as Cr improving   - avoid nephrotoxic meds  - monitor I/Os and serial BMPs

## 2018-02-13 NOTE — CONSULT NOTE ADULT - PROBLEM SELECTOR RECOMMENDATION 4
- bacteremia 2/2 strep viridans - unclear source   - also with T11-12 OM/discitis   - stopped vancomycin and levquin - now on ceftriaxone 2g qd - f/u ID recs

## 2018-02-13 NOTE — CONSULT NOTE ADULT - PROBLEM SELECTOR RECOMMENDATION 5
- on home enalparil and HCTZ - would hold for now until Cr improves further   - can give norvasc if BP becomes elevated

## 2018-02-13 NOTE — CONSULT NOTE ADULT - SUBJECTIVE AND OBJECTIVE BOX
HPI:  Patient is a 66 yo female PMHx of hypothyroid, HTN, asthma who was sent to the ED for abnormal lab. Patient was recently discharged from [facility] for spinal OM on imaging but not on biopsy (biopsy after abx) with PICC line for IV vancomycin/Levaquin, heparin flushes. Per [facility]/[place], the patient was being treated for pansensitive strep viridans.  She presented to today for gum bleeding that started on Wednesday, spontaneous, multiple episodes, and with epistaxis possibly for the past three days but noticed today. Her PCP reshma her lab (only coag) and advised the patient to go the ED for vitamin K shot. Patient reports chronic abdominal and back pain which has been present since mid January. Otherwise denies fever, chills, lightheadedness, CP, palpitations, SOB, cough, N/V/D, dysuria, hematuria, LE edema.  Patient denies history of bleeding, family history of bleeding disorder, never had children but no past miscarriages or lupus in family.    In the ED, she was afebrile, , BP 96/58, sat 97% on RA. CT head was negative for intracranial bleed. She was noticed to have platelet of 2, hematology was consulted and reviewed the smear, 1 unit of platelet ordered. She was given Xanax 0.5mg and decadron 40mg x1. ICU was consulted and the patient was transferred to [facility] for further management.     As per outpatient records: EDER WNL no vegetation, normal LV and RV size and function. Atherosclerotic disease of the aorta. MRI evidence of T11-T12 discitis/osteomyelitis with epidural phlegmon and small abscess, w/out significant cord compression. Possible ileus on 1/26. (12 Feb 2018 17:26)      PAST MEDICAL & SURGICAL HISTORY:  HTN (hypertension)  Thyroid disease  Asthma  Rotator cuff arthropathy, left  Osteoarthritis of both knees  Anxiety  Tear of left biceps muscle  History of total left knee replacement        REVIEW OF SYSTEMS:    CONSTITUTIONAL: No weakness, fevers or chills  EYES/ENT: No visual changes;  No vertigo or throat pain   NECK: No pain or stiffness  RESPIRATORY: No cough, wheezing, hemoptysis; No shortness of breath  CARDIOVASCULAR: No chest pain or palpitations  GASTROINTESTINAL: No abdominal or epigastric pain. No nausea, vomiting, or hematemesis; No diarrhea or constipation. No melena or hematochezia.  GENITOURINARY: No dysuria, frequency or hematuria  NEUROLOGICAL: No numbness or weakness  SKIN: No itching, burning, rashes, or lesions   MSK: no joint pain, no joint swelling  All other review of systems is negative unless indicated above.      MEDICATIONS  (STANDING):  cefTRIAXone Injectable. 2000 milliGRAM(s) IV Push every 24 hours  DULoxetine 60 milliGRAM(s) Oral daily  lactulose Syrup 10 Gram(s) Oral daily    MEDICATIONS  (PRN):      Allergies    No Known Allergies    Intolerances        SOCIAL HISTORY:    FAMILY HISTORY:  No pertinent family history in first degree relatives      Vital Signs Last 24 Hrs  T(C): 36.7 (13 Feb 2018 10:15), Max: 36.8 (13 Feb 2018 02:00)  T(F): 98.1 (13 Feb 2018 10:15), Max: 98.3 (13 Feb 2018 02:00)  HR: 88 (13 Feb 2018 13:05) (82 - 100)  BP: 123/62 (13 Feb 2018 13:05) (104/59 - 123/62)  BP(mean): 86 (13 Feb 2018 13:05) (77 - 86)  RR: 19 (13 Feb 2018 04:00) (17 - 19)  SpO2: 93% (13 Feb 2018 13:05) (93% - 98%)      PHYSICAL EXAM:    Constitutional: WDWN resting comfortably in bed; NAD  Head: NC/AT  Eyes: PERRLA, EOMI, clear conjunctiva  ENT: no nasal discharge; no oropharyngeal erythema or exudates; dry oral mucosa  Neck: supple; no JVD or thyromegaly  Respiratory: CTA B/L; no W/R/R, no retractions  Cardiac: +S1/S2; RRR; no M/R/G; PMI non-displaced  Gastrointestinal: soft, NT/ND; no rebound or guarding; +BS, no hepatosplenomegaly  Extremities: WWP, no clubbing or cyanosis; no peripheral edema  Vascular: 2+ radial, DP/PT pulses B/L  Lymphatic: no submandibular or cervical LAD  Skin: no rash, no ulcers  Neurologic: AAOx3; answers questions appropriately, follows commands, moves all extremities, CNII-XII grossly intact; no focal deficits, motor 5/5 in UE and LE, Reflexes 2+ in UE and LE b/l      LABS:                        7.9    4.9   )-----------( 41       ( 13 Feb 2018 15:20 )             23.0     02-13    136  |  97  |  51<H>  ----------------------------<  175<H>  3.8   |  26  |  1.57<H>    Ca    9.4      13 Feb 2018 15:20  Phos  3.7     02-13  Mg     2.1     02-13    TPro  6.2  /  Alb  3.4  /  TBili  0.4  /  DBili  x   /  AST  37  /  ALT  34  /  AlkPhos  46  02-13    PT/INR - ( 13 Feb 2018 01:13 )   PT: 13.5 sec;   INR: 1.21          PTT - ( 13 Feb 2018 01:13 )  PTT:31.2 sec      RADIOLOGY & ADDITIONAL STUDIES: HPI:  Patient is a 66 yo F with PMHx of hypothyroid, HTN, asthma, recent dx of spinal osteomyelitis presents with epistaxis and gum bleeding and abnormal labs. Pt has history of recent dental procedure (Nov - Jan 2017), recent hospitalization at [facility] 1/2018 for back pain, found to have T11-12 osteomyelitis on MRI with blood Cx positive for strep viridans in one bottle on 01/28, EDER done there was negative for IE, bone biopsy on 01/29 also negative though was done while on Abx. She was treated and discharged on vancomycin and levofloxacin. Since Wednesday last week, pt started to have epistaxis, gum bleeding, easy bruising and developed diffuse petechial rash. Her PCP reshma her lab (only coag) and advised the patient to go the ED for vitamin K shot. In the ED, she was afebrile, , BP 96/58, sat 97% on RA. CT head was negative for intracranial bleed. She was noticed to have platelet of 2, hematology was consulted and pt received 1 unit of platelets. ID consulted for Abx choice for osteomyelitis and strep viridan bacteremia in setting of possibly drug-induced thrombocytopenia.     Since discharge from [facility], pt denies any fevers, chills,  back pain has been relatively well controlled.       PAST MEDICAL & SURGICAL HISTORY:  HTN (hypertension)  Thyroid disease  Asthma  Rotator cuff arthropathy, left  Osteoarthritis of both knees  Anxiety  Tear of left biceps muscle  History of total left knee replacement        REVIEW OF SYSTEMS:    CONSTITUTIONAL: No weakness, fevers or chills  EYES/ENT: No visual changes;  No vertigo or throat pain   NECK: No pain or stiffness  RESPIRATORY: No cough, wheezing, hemoptysis; No shortness of breath  CARDIOVASCULAR: No chest pain or palpitations  GASTROINTESTINAL: No abdominal or epigastric pain. No nausea, vomiting, or hematemesis; No diarrhea or constipation. No melena or hematochezia.  GENITOURINARY: No dysuria, frequency or hematuria  NEUROLOGICAL: No numbness or weakness  SKIN: Diffuse rash, non-pruritic  HEME: epistaxis, gum bleeding, easy bruising  MSK: mild back pain, has been well-controlled than before  All other review of systems is negative unless indicated above.      MEDICATIONS  (STANDING):  cefTRIAXone Injectable. 2000 milliGRAM(s) IV Push every 24 hours  DULoxetine 60 milliGRAM(s) Oral daily  lactulose Syrup 10 Gram(s) Oral daily    MEDICATIONS  (PRN):      Allergies    No Known Allergies    Intolerances        SOCIAL HISTORY:    FAMILY HISTORY:  No pertinent family history in first degree relatives      Vital Signs Last 24 Hrs  T(C): 36.7 (13 Feb 2018 10:15), Max: 36.8 (13 Feb 2018 02:00)  T(F): 98.1 (13 Feb 2018 10:15), Max: 98.3 (13 Feb 2018 02:00)  HR: 88 (13 Feb 2018 13:05) (82 - 100)  BP: 123/62 (13 Feb 2018 13:05) (104/59 - 123/62)  BP(mean): 86 (13 Feb 2018 13:05) (77 - 86)  RR: 19 (13 Feb 2018 04:00) (17 - 19)  SpO2: 93% (13 Feb 2018 13:05) (93% - 98%)      PHYSICAL EXAM:      Constitutional: WDWN, NAD, comfortable in bed  HEENT: NC/AT, PERRLA, EOMI, no conjunctival pallor or scleral icterus, MMM, dried blood in gum and bottom lip, petechia in oropharynx   Neck: Supple, no JVD. Anterior neck mildly tender to palpation though no palpable cervical, submandibular, or supraclavicular LAD  Respiratory: CTA b/l, no W/R/R  Cardiovascular: RRR, normal S1 and S2, no M/R/G  Gastrointestinal: +BS, soft NTND, no guarding or rebound tenderness, no palpable masses   Back: thoracic spine (approximately t10-12) mildly tender to palpation. No step-offs  Extremities: WWP, no cyanosis, clubbing or edema.   Vascular: 2+ radial bl, DPPT 2+ b/l  Neurological: AAOx3, no focal deficits, strength and sensation intact throughout.   Skin: scattered petechiae and ecchymoses on UE, LE and back and abdomen      LABS:                        7.9    4.9   )-----------( 41       ( 13 Feb 2018 15:20 )             23.0     02-13    136  |  97  |  51<H>  ----------------------------<  175<H>  3.8   |  26  |  1.57<H>    Ca    9.4      13 Feb 2018 15:20  Phos  3.7     02-13  Mg     2.1     02-13    TPro  6.2  /  Alb  3.4  /  TBili  0.4  /  DBili  x   /  AST  37  /  ALT  34  /  AlkPhos  46  02-13    PT/INR - ( 13 Feb 2018 01:13 )   PT: 13.5 sec;   INR: 1.21          PTT - ( 13 Feb 2018 01:13 )  PTT:31.2 sec      RADIOLOGY & ADDITIONAL STUDIES:    INTERPRETATION:  Clinical history: Platelets 2000. Evaluate for   intracranial hemorrhage.    Technique: CT head was performed utilizing axial images from the base of   the skull through the vertex without the administration of intravenous   contrast. Images were reviewed in the bone, brain and subdural windows.    Findings: Comparison is made to a prior CT performed on 10/20/2017    There is no evidence of acute intracranial hemorrhage or acute   transcortical infarct.  There are mild patchy areas of low density within   the periventricular white matter consistent with mild microvascular   disease. The ventricles are normal in size and caliber.  There is no evidence of mass-effect or midline shift. There is no   evidence of an intra or extra-axial fluid collection.    Visualized paranasal sinuses and bilateral mastoid air cells are clear.    Impression: Mild microvascular disease. No evidence of acute intracranial   injury      INTERPRETATION:    CTA (CT ANGIOGRAPHY) OF THE CHEST, ABDOMEN, PELVIS AND LOWER EXTREMITIES   DATED 1/24/2018 2:10 AM    INDICATION: Abdominal pain. Concern for dissection.    TECHNIQUE: CTA (CT ANGIOGRAPHY) of the chest, abdomen and pelvis was   performed. Multiplanar images of the chest, abdomen and pelvis were   reconstructed on an independent work-station. Image postprocessing   including production of axial images and coronal and sagittal maximum   intensity projections (MIPs).     PRIOR STUDIES: None.    FINDINGS:  No aortic dissection is seen. Minimal calcified plaque is seen   along the aorta.  No aortic aneurysm is evident. No critical stenoses are   seen.  Contrast bolus has not reached beyond the popliteal arteries and   does not properly opacify the distal lower extremity arteries.    No central pulmonary embolism.    The heart is normal in size. No pericardial effusion is seen. No   mediastinal, hilar or axillary lymphadenopathy is seen.    Mild bibasilar atelectasis.  No pleural effusions are seen.    Images of the upper abdomen demonstrate probable hepatic steatosis. The   gallbladder is distended. There is no gallbladder wall thickening or   pericholecystic fluid. No radiodense gallstones are seen. The pancreas is   normal in appearance.  No splenic abnormalities are seen.    The adrenal glands are unremarkable. The kidneys are normal in   appearance. No lymphadenopathy is seen.    Evaluation of the bowel is limited without oral contrast. Within that   limitation, there is colonic diverticulosis. Small hiatal hernia. No   ascites is seen.     Images of the pelvis demonstrate the uterus is normal in appearance. No   adnexal masses. The urinary bladder is distended.     Evaluation of the osseous structures demonstrates left knee arthroplasty.   Severe degenerative disc disease.      IMPRESSION:  1.  No aortic dissection. No critical stenosis.  2.  No central pulmonary embolism.  3.  No evidence of mechanical bowel obstruction  4.  Distended gallbladder without gallbladder wall thickening or   pericholecystic fluid. If there is concern for acute cholecystitis   further evaluation may be obtained with HIDA scan.

## 2018-02-13 NOTE — CONSULT NOTE ADULT - ATTENDING COMMENTS
Briefly, 66 yo female with hx dental procedure 11/2017, recent hospitalization at [facility] 1/2018 for back pain, found to have T11-12 osteomyelitis on MRI and bcx pos for viridans strep on ?1/28. EDER done there reportedly negative. Spine bx also reportedly negative but done whilst on antibiotics. She was commenced on vancomycin and levofloxacin. Since last week she began experiencing easy bruising/bleeding and developed a petechial rash. Currently reports back pain controlled. No f/c. Exam notable for diffuse ecchymoses and petechiae; mild mid-spine TTP. Labs most notable for plts 2, Cr 1.7 (WIN), WBC 4.7 with 6% eos. Suspect drug-induced thrombocytopenia/ITP with vancomycin or levofloxacin being likely culprits. These have been stopped and should not be restarted. Start ceftriaxone 2g IV q24h for treatment of presumed streptococcal NVO with the caveat that all beta-lactam antibiotics may have a potentially myelosuppressive effect. Check baseline then weekly ESR, CRP.

## 2018-02-13 NOTE — PROGRESS NOTE ADULT - ASSESSMENT
67 year old F who presents with 5 day history of gum bleeding and bruising in upper extremities and on further workup is found to have severe thrombocytopenia with platelet count of 2K. No CNS bleed or active bleeding episodes however pt having gum bleeding and epistaxis. Differential diagnosis likely drug-induced ITP however will r/o HIT and TTP.    Peripheral smear reviewed:   RBC's: numerous micro-spherocytes, no shistocytes.   Platelets: NO platelets noted on peripheral smear; no evidence of clumping  WBC's: Pseudo-Pelger-Huet cells noted, no blasts. 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 She is likely platelet refractory given no interval increase in platelet count after 2 units. She is s/p IV Dexamethasone yesterday and receiving IVIG at this time.   Degree of thrombocytopenia severe. Given degree of thrombocytopenia, etiology is usually ITP (can be drug induced) vs TTP. 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 Vancomycin is a well known culprit. No evidence of shistocytes on peripheral smear and hemoylsis labs do not indicate a hemolytic anemia and therefore TTP is less likely. Given recent heparin exposure on hospital admission last week, HIT is within differential but less likely as patients w/ HIT rarely ever have plt count < 20K.   -Recommend d/c of vancomycin  -Decadron 40 mg IV given already, will continue w/ 3 more days of therapy  -Transfuse 1 unit of platelets given risk of spontaneous CNS bleed in patients with plt count < 10K (CT head negative at baseline); please check post-transfusion platelet count 1 hour after transfusion is completed to evaluate for platelet refractoriness  -We recommend starting patient on concomitant IVIG therapy; given renal dysfunction, we will start her on 0.5 g/kg instead of standard 1 g/kg. Please infuse 35 g of Gammagard (IVIG) after completion of platelet transfusion. We will do this therapy 2 consecutive days. Please infuse IVIG over 6 hours per pharmacy to decrease risk of renal failure.   -f/u HIT and ALYCIA panel  -f/u [id number] (likely to be neg)    #Anemia  Normocytic and new-onset. Pt had no underlying anemia on labs on 1/24. No evidence of tobi hemolysis given elevated hapto, normal retic and bili and no shistocytes noted  -Recommend checking Direct Nieves panel (pt may have autoimmune hemolytic anemia secondary to vanco); spherocytes can be seen in immune hemolysis from drugs.   -Iron studies, B12, and folate to evaluate for reversible causes   -If Hb < 7 g/dL or pt symptomatic, recommend transfusion of pRBC    Case discussed w/ Dr. Dunlap 67 year old F who presents with 5 day history of gum bleeding and bruising in upper extremities and on further workup is found to have severe thrombocytopenia with platelet count of 2K. No CNS bleed or active bleeding episodes however pt having gum bleeding and epistaxis. Differential diagnosis likely drug-induced ITP however will r/o HIT and TTP.    Peripheral smear reviewed:   RBC's: numerous micro-spherocytes, no shistocytes.   Platelets: NO platelets noted on peripheral smear; no evidence of clumping  WBC's: Pseudo-Pelger-Huet cells noted, no blasts. 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 She is likely platelet refractory given no interval increase in platelet count after 2 units. She is s/p IV Dexamethasone yesterday and receiving IVIG at this time. HIT and TTP less likely. CT head negative for CNS bleed.     -Decadron 40 mg IV x 4 days (started on 2/12)  -Receiving IVIG 35 g (0.5g/kg) x 1 dose (will be infused slowly over 6 hours given renal dysfunction)  -Please transfuse 1 more unit of platelets given risk of CNS hemorrhage, please check platelet count 30 mins to 1 hour after transfusion is complete to document true platelet refractoriness. If patient is truly platelet refractory, we will d/w blood bank about HLA typing and ordering HLA cross-matched units  -f/u HIT and ALYCIA panel  -f/u [id number] (likely to be neg)    #Anemia  Normocytic and new-onset. Anemia worsened since yesterday. Pt had no underlying anemia on labs on 1/24. No evidence of tobi hemolysis given elevated hapto, normal retic and bili and no shistocytes noted  -Recommend checking Direct Nieves panel (pt may have autoimmune hemolytic anemia secondary to vanco); spherocytes can be seen in immune hemolysis from drugs.   -Iron studies show anemia of chronic disease or inflammation given low TIBC and significantly increased ferritin; f/u B12 and folate.   -If Hb < 7 g/dL or pt symptomatic, recommend transfusion of pRBC    Case discussed w/ Dr. Dunlap

## 2018-02-13 NOTE — CONSULT NOTE ADULT - SUBJECTIVE AND OBJECTIVE BOX
68 yo F     PAST MEDICAL & SURGICAL HISTORY:  HTN (hypertension)  Thyroid disease  Asthma  Rotator cuff arthropathy, left  Osteoarthritis of both knees  Anxiety  Tear of left biceps muscle  History of total left knee replacement      MEDICATIONS  (STANDING):  cefTRIAXone Injectable. 2000 milliGRAM(s) IV Push every 24 hours  DULoxetine 60 milliGRAM(s) Oral daily  lactulose Syrup 10 Gram(s) Oral daily    MEDICATIONS  (PRN):      Allergies    No Known Allergies    Intolerances        SOCIAL HISTORY:    FAMILY HISTORY:  No pertinent family history in first degree relatives      T(C): , Max: 36.8 (02-13-18 @ 02:00)  T(F): , Max: 98.3 (02-13-18 @ 02:00)  HR: 88 (02-13-18 @ 13:05)  BP: 123/62 (02-13-18 @ 13:05)  BP(mean): 86 (02-13-18 @ 13:05)  RR: 19 (02-13-18 @ 04:00)  SpO2: 93% (02-13-18 @ 13:05)  Wt(kg): --    02-12 @ 07:01  -  02-13 @ 07:00  --------------------------------------------------------  IN: 0 mL / OUT: 400 mL / NET: -400 mL      Height (cm): 152.4 (02-12 @ 19:56)  Weight (kg): 82.2 (02-12 @ 19:56)  BMI (kg/m2): 35.4 (02-12 @ 19:56)  BSA (m2): 1.79 (02-12 @ 19:56)    .  VITAL SIGNS:  T(C): 36.7 (02-13-18 @ 10:15), Max: 36.8 (02-13-18 @ 02:00)  T(F): 98.1 (02-13-18 @ 10:15), Max: 98.3 (02-13-18 @ 02:00)  HR: 88 (02-13-18 @ 13:05) (82 - 100)  BP: 123/62 (02-13-18 @ 13:05) (104/59 - 123/62)  BP(mean): 86 (02-13-18 @ 13:05) (77 - 86)  RR: 19 (02-13-18 @ 04:00) (17 - 19)  SpO2: 93% (02-13-18 @ 13:05) (93% - 98%)  Wt(kg): --    PHYSICAL EXAM:    Constitutional: WDWN resting comfortably in bed; NAD  Head: NC/AT  Eyes: PERRL, EOMI, clear conjunctiva  ENT: no nasal discharge; uvula midline, no oropharyngeal erythema or exudates; MMM  Neck: supple; no JVD or thyromegaly  Respiratory: CTA B/L; no W/R/R, no retractions  Cardiac: +S1/S2; RRR; no M/R/G; PMI non-displaced  Gastrointestinal: soft, NT/ND; no rebound or guarding; +BSx4  Genitourinary: normal external genitalia  Back: spine midline, no bony tenderness or step-offs; no CVAT B/L  Extremities: WWP, no clubbing or cyanosis; no peripheral edema  Musculoskeletal: NROM x4; no joint swelling, tenderness or erythema  Vascular: 2+ radial, femoral, DP/PT pulses B/L  Dermatologic: skin warm, dry and intact; no rashes, wounds, or scars  Lymphatic: no submandibular or cervical LAD  Neurologic: AAOx3; CNII-XII grossly intact; no focal deficits  Psychiatric: affect and characteristics of appearance, verbalizations, behaviors are appropriate    LABS:                        7.9    4.9   )-----------( 41       ( 13 Feb 2018 15:20 )             23.0     02-13    136  |  97  |  51<H>  ----------------------------<  175<H>  3.8   |  26  |  1.57<H>    Ca    9.4      13 Feb 2018 15:20  Phos  3.7     02-13  Mg     2.1     02-13    TPro  6.2  /  Alb  3.4  /  TBili  0.4  /  DBili  x   /  AST  37  /  ALT  34  /  AlkPhos  46  02-13      PT/INR - ( 13 Feb 2018 01:13 )   PT: 13.5 sec;   INR: 1.21          PTT - ( 13 Feb 2018 01:13 )  PTT:31.2 sec    Creatinine, Random Urine: 93 mg/dL (02-12 @ 22:28)  Sodium, Random Urine: 26 mmol/L (02-12 @ 22:28)        RADIOLOGY & ADDITIONAL STUDIES: 66 yo F with HTN, Hypothyroidism, Asthma with recent hospitalization for Strep Viridans bacteremia and suspected T11-12 OM/Discitis presents from home with bleeding gums and epistaxis, was found to have platelets 2K and Cr 2.03, admitted for further workup. Patient was recently hospitalized from 1/26-2/5 at [facility] where she was found to have strep viridans bacteremia and started on vancomycin and levaquin. Also found to have T11-T12 discitis/OM but biopsy performed at site was negative for any growth. On 2/8 while she was at home she developed multiple episodes of epistaxis and gum bleeding. She informed her PMD who saw her in the office on 2/10, found to have slightly elevated INR and referred to ED. Also had intermittent episodes of nausea and nbnb vomiting a/w constipation, with somewhat decreased PO intake. She continued to have back and abdominal pain, for which she took methocarbamol for. Took 1-2 pills of Aleve, but no other NSAIDs. Last dose of vancomycin was on 2/12/18 at home. Unclear when last vancomycin trough was. No other recent change in medications. Previous Cr from 1/24/18 was 0.69, unknown what Cr was when patient left hospital on 2/5/2018. Denies fever, chills, headache, lightheadedness, chest pain, dyspnea, hemoptysis, dysuria, hematuria, changes in urinary habits, paresthesias. All other ROS negative.     PAST MEDICAL & SURGICAL HISTORY:  HTN (hypertension)  Thyroid disease  Asthma  Rotator cuff arthropathy, left  Osteoarthritis of both knees  Anxiety  Tear of left biceps muscle  History of total left knee replacement      MEDICATIONS  (STANDING):  cefTRIAXone Injectable. 2000 milliGRAM(s) IV Push every 24 hours  DULoxetine 60 milliGRAM(s) Oral daily  lactulose Syrup 10 Gram(s) Oral daily    MEDICATIONS  (PRN):      Allergies    No Known Allergies    Intolerances        SOCIAL HISTORY:    FAMILY HISTORY:  No pertinent family history in first degree relatives      T(C): , Max: 36.8 (02-13-18 @ 02:00)  T(F): , Max: 98.3 (02-13-18 @ 02:00)  HR: 88 (02-13-18 @ 13:05)  BP: 123/62 (02-13-18 @ 13:05)  BP(mean): 86 (02-13-18 @ 13:05)  RR: 19 (02-13-18 @ 04:00)  SpO2: 93% (02-13-18 @ 13:05)  Wt(kg): --    02-12 @ 07:01  -  02-13 @ 07:00  --------------------------------------------------------  IN: 0 mL / OUT: 400 mL / NET: -400 mL      Height (cm): 152.4 (02-12 @ 19:56)  Weight (kg): 82.2 (02-12 @ 19:56)  BMI (kg/m2): 35.4 (02-12 @ 19:56)  BSA (m2): 1.79 (02-12 @ 19:56)    .  VITAL SIGNS:  T(C): 36.7 (02-13-18 @ 10:15), Max: 36.8 (02-13-18 @ 02:00)  T(F): 98.1 (02-13-18 @ 10:15), Max: 98.3 (02-13-18 @ 02:00)  HR: 88 (02-13-18 @ 13:05) (82 - 100)  BP: 123/62 (02-13-18 @ 13:05) (104/59 - 123/62)  BP(mean): 86 (02-13-18 @ 13:05) (77 - 86)  RR: 19 (02-13-18 @ 04:00) (17 - 19)  SpO2: 93% (02-13-18 @ 13:05) (93% - 98%)  Wt(kg): --    PHYSICAL EXAM:    Constitutional: WDWN resting comfortably in bed; NAD  Eyes: PERRL, EOMI, clear conjunctiva  ENT: no nasal discharge; uvula midline, MMM; petechial on hard palate and oral mucosa; dried blood and ecchymosis around lips   Neck: supple; no JVD or thyromegaly  Respiratory: CTA B/L; no W/R/R, no retractions  Cardiac: +S1/S2; RRR; no M/R/G; PMI non-displaced  Gastrointestinal: soft, NT/ND; no rebound or guarding; +BSx4  Genitourinary: no CVA tenderness   Extremities: WWP, no clubbing or cyanosis; no peripheral edema  Musculoskeletal: NROM x4; no joint swelling, tenderness or erythema  Vascular: 2+ radial, femoral, DP/PT pulses B/L  Dermatologic: scattered nonblanching petechia throughout UE and LE  Lymphatic: no submandibular or cervical LAD  Neurologic: AAOx3; CNII-XII grossly intact; no focal deficits      LABS:                        7.9    4.9   )-----------( 41       ( 13 Feb 2018 15:20 )             23.0     02-13    136  |  97  |  51<H>  ----------------------------<  175<H>  3.8   |  26  |  1.57<H>    Ca    9.4      13 Feb 2018 15:20  Phos  3.7     02-13  Mg     2.1     02-13    TPro  6.2  /  Alb  3.4  /  TBili  0.4  /  DBili  x   /  AST  37  /  ALT  34  /  AlkPhos  46  02-13      PT/INR - ( 13 Feb 2018 01:13 )   PT: 13.5 sec;   INR: 1.21          PTT - ( 13 Feb 2018 01:13 )  PTT:31.2 sec    Creatinine, Random Urine: 93 mg/dL (02-12 @ 22:28)  Sodium, Random Urine: 26 mmol/L (02-12 @ 22:28)        RADIOLOGY & ADDITIONAL STUDIES:  < from: CT Head No Cont (02.12.18 @ 15:25) >   Comparison is made to a prior CT performed on 10/20/2017    There is no evidence ofacute intracranial hemorrhage or acute   transcortical infarct.  There are mild patchy areas of low density within   the periventricular white matter consistent with mild microvascular   disease. The ventricles are normal in size and caliber.  There is no evidence of mass-effect or midline shift. There is no   evidence of an intra or extra-axial fluid collection.    Visualized paranasal sinuses and bilateral mastoid air cells are clear.    Impression: Mild microvascular disease. No evidence of acute intracranial   injury      < end of copied text >

## 2018-02-13 NOTE — CONSULT NOTE ADULT - ASSESSMENT
68 yo F with HTN, Hypothyroidism, Asthma with recent hospitalization for Strep Viridans bacteremia and suspected T11-12 OM/Discitis presents from home with bleeding gums and epistaxis, was found to have platelets 2K and Cr 2.03, admitted for further workup

## 2018-02-13 NOTE — CONSULT NOTE ADULT - ASSESSMENT
66 yo F with PMHx of hypothyroid, HTN, asthma sent to ED by PCP for spontaneous gum bleeding, epistaxis, found to have WIN and new thrombocytopenia (Plts 2K), suspicious for drug-induced thrombocytopenia vs. ITP in setting of new abx (vanc/ levaquin) for recently diagnosed spinal osteomyelitis and strep viridan bacteremia. Vancomycin and Levaquin have been stopped.    Recommend:  - Start Ceftriaxone 2g IV q24hrs for treatment of vertebral osteomyelitis, though beta lactam Abx can also have myelosuppressive effects  - Please obtain baseline and then weekly ESR and CRP

## 2018-02-13 NOTE — CONSULT NOTE ADULT - PROBLEM SELECTOR RECOMMENDATION 2
unclear etiology, could be drug induced ATN.   S/p 2L fluid in the ED.  Would follow up with UA, urine lytes.
- suspected secondary to ITP from vancomycin   - platelets improved from 2 --> 41 s/p 3packs of platelets, 40mg IV decadron, and IVIG x 1  - f/u heme recs

## 2018-02-13 NOTE — PROGRESS NOTE ADULT - SUBJECTIVE AND OBJECTIVE BOX
OVERNIGHT EVENTS: Platelet count unchanged after 1U transfusion, though CBC was drawn 3 hours after transfusion complete. Hgb also down 10.7-->8.4. Repeat hemolysis labs unchanged from prior. Minimal active bleeding overnight (several droplets from nose).  Received 2nd platelet transfusion with plans to start IVIG afterwards (still being mixed by pharmacy). Potassium repleted. Platelets remain 2k s/p 2U platelets, Hgb stable 8.3; 6am coughed up solid bloody lugey, query if from lungs (much less likely), but rather upper airway/oropharyngeal as she had nosebleed again o/n and was likely from that.    SUBJECTIVE: Patient reports feeling slightly better than before. She was able to tolerate PO intake well.     Vital Signs Last 12 Hrs  T(F): 98.1 (02-13-18 @ 10:15), Max: 98.3 (02-13-18 @ 02:00)  HR: 90 (02-13-18 @ 08:26) (90 - 100)  BP: 115/58 (02-13-18 @ 08:26) (112/53 - 116/57)  BP(mean): 82 (02-13-18 @ 08:26) (77 - 82)  RR: 19 (02-13-18 @ 04:00) (17 - 19)  SpO2: 93% (02-13-18 @ 08:26) (93% - 95%)  I&O's Summary    12 Feb 2018 07:01  -  13 Feb 2018 07:00  --------------------------------------------------------  IN: 0 mL / OUT: 400 mL / NET: -400 mL    PHYSICAL EXAM:  Constitutional: NAD, comfortable in bed.  HEENT: NC/AT, PERRLA, EOMI, no conjunctival pallor or scleral icterus, MMM, dried blood in gums, petechia in oropharynx   Neck: Supple, no JVD  Respiratory: Normal rate, rhythm, depth, effort. CTAB. No w/r/r.   Cardiovascular: RRR, normal S1 and S2, no m/r/g.   Gastrointestinal: +BS, soft NTND, no guarding or rebound tenderness, no palpable masses   Extremities: wwp; no cyanosis, clubbing or edema.   Vascular: Pulses equal and strong throughout.   Neurological: AAOx3, no CN deficits, strength and sensation intact throughout.   Skin: ecchymosis on UE, LE and back    LABS:                        8.3    4.7   )-----------( 2        ( 13 Feb 2018 05:21 )             23.7     02-13    136  |  94<L>  |  50<H>  ----------------------------<  140<H>  3.4<L>   |  27  |  1.70<H>    Ca    9.1      13 Feb 2018 05:21  Phos  3.7     02-13  Mg     2.1     02-13    TPro  6.2  /  Alb  3.4  /  TBili  0.4  /  DBili  x   /  AST  37  /  ALT  34  /  AlkPhos  46  02-13  PT/INR - ( 13 Feb 2018 01:13 )   PT: 13.5 sec;   INR: 1.21     PTT - ( 13 Feb 2018 01:13 )  PTT:31.2 sec    RADIOLOGY & ADDITIONAL TESTS:    MEDICATIONS  (STANDING):  dexamethasone  Injectable 40 milliGRAM(s) IV Push daily  DULoxetine 60 milliGRAM(s) Oral daily  lactulose Syrup 10 Gram(s) Oral daily    MEDICATIONS  (PRN):

## 2018-02-13 NOTE — PROGRESS NOTE ADULT - SUBJECTIVE AND OBJECTIVE BOX
Interval Hx: Pt received Dexamethasone 40 mg IV yesterday. She received 1 unit of platelets with plt count checked 3 hours after with no increase in count. She received another unit of platelets overnight without any increase in plt count. IVIG started at 6AM this morning. Nurse notes 2 episodes of epistaxis. Hb has decreased to 8.3 this AM but she is hemodynamically stable. She has no acute complaints. Denies fevers/chills. No BRBPR, melena or hematuria overnight.     Initial HPI:  ESTEPHANIE FLORES is a 67y Female with history of HTN, HLD and hypothyroidism who presents with 5 day history of gum bleeding and sites of injection. She was seen at [facility] ED on 1/24 for back pain. At that time, her hb and platelet count were normal. She had an elevated WBC count. Renal function WNL. She was discharged on analgesics however back pain worsened and she presented to [facility] few days later. She was subsequently found to have a T11-T12 osteomyelitis with abscess collection. She was started on IV Vancomycin and Levaquin. She was discharged on 2/5 with PICC line and to continue IV abx as outpatient. Of note, she was on a SQ blood thinner likely heparin or lovenox during that admission. No thrombocytopenia on discharge. She says she has never had thrombocytopenia in the past.     Gum bleeding began on 2/8. She initially thought gum bleeding was due to brushing her teeth but it continued to persist throughout weekend. She also noted bruising in sites of blood draws as well as where she SQ heparin during prior admission. She called her PCP Dr. Moise Hayden who had coags drawn on 2/10. Coags showed slightly prolonged INR and he recommended to come into ER to have IV Vitamin K x 1 dose. She denies hemarthroses. No BRBPR or melena. Per ED team, she had negative guiac. No hematuria.     No fevers/chills or night sweats. She notes weight loss due to reduced appetite for 1 week but no unintentional weight loss over the past few months. No leg pain or swelling.    In the ED, she had 1 episode of epistaxis.         Medications:  MEDICATIONS  (STANDING):  atorvastatin 10 milliGRAM(s) Oral at bedtime  buDESOnide 160 MICROgram(s)/formoterol 4.5 MICROgram(s) Inhaler 2 Puff(s) Inhalation two times a day  dexamethasone  Injectable 40 milliGRAM(s) IV Push daily  DULoxetine 60 milliGRAM(s) Oral daily  lactulose Syrup 10 Gram(s) Oral daily  levothyroxine 88 MICROGram(s) Oral once    PHYSICAL EXAM:    T(C): 36.4 (02-13-18 @ 04:52), Max: 36.8 (02-13-18 @ 02:00)  T(F): 97.6 (02-13-18 @ 04:52), Max: 98.3 (02-13-18 @ 02:00)  HR: 90 (02-13-18 @ 08:26) (82 - 111)  BP: 115/58 (02-13-18 @ 08:26) (96/58 - 118/55)  ABP: --  ABP(mean): --  RR: 19 (02-13-18 @ 04:00) (17 - 19)  SpO2: 93% (02-13-18 @ 08:26) (93% - 98%)      Gen: well developed, well nourished  HEENT: NCAT, No conjunctival pallor, no scleral icterus. Dried up blood noted on lips and on gum as well as fresh blood in gum. Petechiae noted in the orophraynx  Neck: supple, no JVD  Lymph nodes: No cervical, axillary or inguinal adenopathy  Cardiovascular: RR, nl S1S2, no murmurs/rubs/gallops  Respiratory: clear air entry b/l  Gastrointestinal: BS+, soft, NT/ND, no masses, no splenomegaly, no hepatomegaly, no evidence for ascites  Extremities: no clubbing/cyanosis, no edema, no calf tenderness  Vascular:  DP/PT 2+ b/l  Neurological: CN 2-12 grossly intact, no focal deficits  Skin: no rash however ecchymoses noted bilaterally in upper extremities   BacK: no petechiae noted in lower back however ecchymoses noted on buttocks        Labs:                          8.3    4.7   )-----------( 2        ( 13 Feb 2018 05:21 )             23.7         CBC Full  -  ( 13 Feb 2018 05:21 )  WBC Count : 4.7 K/uL  Hemoglobin : 8.3 g/dL  Hematocrit : 23.7 %  Platelet Count - Automated : 2 K/uL  Mean Cell Volume : 85.3 fL  Mean Cell Hemoglobin : 29.9 pg  Mean Cell Hemoglobin Concentration : 35.0 g/dL  Auto Neutrophil # : x  Auto Lymphocyte # : x  Auto Monocyte # : x  Auto Eosinophil # : x  Auto Basophil # : x  Auto Neutrophil % : x  Auto Lymphocyte % : x  Auto Monocyte % : x  Auto Eosinophil % : x  Auto Basophil % : x        02-13    136  |  94<L>  |  50<H>  ----------------------------<  140<H>  3.4<L>   |  27  |  1.70<H>    Ca    9.1      13 Feb 2018 05:21  Phos  3.7     02-13  Mg     2.1     02-13    TPro  6.2  /  Alb  3.4  /  TBili  0.4  /  DBili  x   /  AST  37  /  ALT  34  /  AlkPhos  46  02-13        PT/INR - ( 13 Feb 2018 01:13 )   PT: 13.5 sec;   INR: 1.21          PTT - ( 13 Feb 2018 01:13 )  PTT:31.2 sec

## 2018-02-13 NOTE — PROGRESS NOTE ADULT - PROBLEM SELECTOR PLAN 1
Presenting with platelet count of 2k with bleeding gums and diffuse petechial rash. Likely 2/2 drug induced d/t vancomycin, less likely HIT (T score of 5), ITP (though platelets are severely low), and TTP is possible but less likely (no schistocytes, no fever, no neurologic symptoms).  -Heme consulted by ED, f/u recs  -mildly increased fibrinogen, TSH low,  -f/u [id number], serotonin assay   -c/w decadron 40mg for 3 more days  -currently receiving 35mg over 6 hours for renal impairment  -will receive 1 Unit platelets following IVIG and will f/u CBC post transfusion  -will hold off on further heparin and SCDs  -fall precautions

## 2018-02-13 NOTE — CONSULT NOTE ADULT - PROBLEM SELECTOR RECOMMENDATION 3
- Hgb trending down from 13 --> 7.9   - iron panel wnl, LDH normal, Haptoglobin elevated  - TSH slightly low but not out of range   - may consider fecal occult given elevated BUN and downtrending Hgb in setting of thrombocytopenia

## 2018-02-13 NOTE — PROGRESS NOTE ADULT - ASSESSMENT
68 yo female pmh of hypothyroid, HTN, who was sent to the ED for spontaneous gum bleeding, found to have platelet of 2k, likely decreased 2/2 Antibiotic (vancomycin) induced thrombocytopenia, less likely ITP vs HIT given severity of thrombocytopenia.

## 2018-02-13 NOTE — PROGRESS NOTE ADULT - PROBLEM SELECTOR PLAN 2
Per patient, biopsy was negative at previous hospitalization, however found to be bacteremic. Per [facility], patient bacteremic with pan-sensitive strep viridans but discharged on Levaquin and vancomycin 2/2 esr decrease on these abx  -[facility] record in chart  -ID on board, f/u recs regarding Abx

## 2018-02-14 LAB
ANION GAP SERPL CALC-SCNC: 12 MMOL/L — SIGNIFICANT CHANGE UP (ref 5–17)
APTT BLD: 28.2 SEC — SIGNIFICANT CHANGE UP (ref 27.5–37.4)
BUN SERPL-MCNC: 49 MG/DL — HIGH (ref 7–23)
CALCIUM SERPL-MCNC: 9 MG/DL — SIGNIFICANT CHANGE UP (ref 8.4–10.5)
CHLORIDE SERPL-SCNC: 100 MMOL/L — SIGNIFICANT CHANGE UP (ref 96–108)
CO2 SERPL-SCNC: 27 MMOL/L — SIGNIFICANT CHANGE UP (ref 22–31)
CREAT SERPL-MCNC: 1.44 MG/DL — HIGH (ref 0.5–1.3)
CRP SERPL-MCNC: 1.03 MG/DL — HIGH (ref 0–0.4)
ERYTHROCYTE [SEDIMENTATION RATE] IN BLOOD: 80 MM/HR — HIGH
ERYTHROCYTE [SEDIMENTATION RATE] IN BLOOD: 93 MM/HR — HIGH
GLUCOSE SERPL-MCNC: 123 MG/DL — HIGH (ref 70–99)
HCT VFR BLD CALC: 22.8 % — LOW (ref 34.5–45)
HCT VFR BLD CALC: 26.4 % — LOW (ref 34.5–45)
HGB BLD-MCNC: 7.6 G/DL — LOW (ref 11.5–15.5)
HGB BLD-MCNC: 8.9 G/DL — LOW (ref 11.5–15.5)
INR BLD: 1.11 — SIGNIFICANT CHANGE UP (ref 0.88–1.16)
MAGNESIUM SERPL-MCNC: 2.2 MG/DL — SIGNIFICANT CHANGE UP (ref 1.6–2.6)
MCHC RBC-ENTMCNC: 29.6 PG — SIGNIFICANT CHANGE UP (ref 27–34)
MCHC RBC-ENTMCNC: 30.1 PG — SIGNIFICANT CHANGE UP (ref 27–34)
MCHC RBC-ENTMCNC: 33.3 G/DL — SIGNIFICANT CHANGE UP (ref 32–36)
MCHC RBC-ENTMCNC: 33.7 G/DL — SIGNIFICANT CHANGE UP (ref 32–36)
MCV RBC AUTO: 88.7 FL — SIGNIFICANT CHANGE UP (ref 80–100)
MCV RBC AUTO: 89.2 FL — SIGNIFICANT CHANGE UP (ref 80–100)
PLATELET # BLD AUTO: 10 K/UL — CRITICAL LOW (ref 150–400)
PLATELET # BLD AUTO: 20 K/UL — CRITICAL LOW (ref 150–400)
POTASSIUM SERPL-MCNC: 3.4 MMOL/L — LOW (ref 3.5–5.3)
POTASSIUM SERPL-SCNC: 3.4 MMOL/L — LOW (ref 3.5–5.3)
PROTHROM AB SERPL-ACNC: 12.3 SEC — SIGNIFICANT CHANGE UP (ref 9.8–12.7)
RBC # BLD: 2.57 M/UL — LOW (ref 3.8–5.2)
RBC # BLD: 2.96 M/UL — LOW (ref 3.8–5.2)
RBC # FLD: 12.5 % — SIGNIFICANT CHANGE UP (ref 10.3–16.9)
RBC # FLD: 12.8 % — SIGNIFICANT CHANGE UP (ref 10.3–16.9)
SODIUM SERPL-SCNC: 139 MMOL/L — SIGNIFICANT CHANGE UP (ref 135–145)
VANCOMYCIN TROUGH SERPL-MCNC: 13 UG/ML — SIGNIFICANT CHANGE UP (ref 10–20)
WBC # BLD: 5.8 K/UL — SIGNIFICANT CHANGE UP (ref 3.8–10.5)
WBC # BLD: 6.2 K/UL — SIGNIFICANT CHANGE UP (ref 3.8–10.5)
WBC # FLD AUTO: 5.8 K/UL — SIGNIFICANT CHANGE UP (ref 3.8–10.5)
WBC # FLD AUTO: 6.2 K/UL — SIGNIFICANT CHANGE UP (ref 3.8–10.5)

## 2018-02-14 PROCEDURE — 99232 SBSQ HOSP IP/OBS MODERATE 35: CPT | Mod: GC

## 2018-02-14 PROCEDURE — 99233 SBSQ HOSP IP/OBS HIGH 50: CPT

## 2018-02-14 PROCEDURE — 99233 SBSQ HOSP IP/OBS HIGH 50: CPT | Mod: GC

## 2018-02-14 RX ORDER — IMMUNE GLOBULIN,GAMMA(IGG) 5 %
35 VIAL (ML) INTRAVENOUS ONCE
Qty: 0 | Refills: 0 | Status: DISCONTINUED | OUTPATIENT
Start: 2018-02-14 | End: 2018-02-14

## 2018-02-14 RX ORDER — POTASSIUM CHLORIDE 20 MEQ
40 PACKET (EA) ORAL EVERY 4 HOURS
Qty: 0 | Refills: 0 | Status: COMPLETED | OUTPATIENT
Start: 2018-02-14 | End: 2018-02-14

## 2018-02-14 RX ORDER — ACETAMINOPHEN 500 MG
650 TABLET ORAL ONCE
Qty: 0 | Refills: 0 | Status: COMPLETED | OUTPATIENT
Start: 2018-02-14 | End: 2018-02-14

## 2018-02-14 RX ADMIN — Medication 650 MILLIGRAM(S): at 01:39

## 2018-02-14 RX ADMIN — Medication 40 MILLIEQUIVALENT(S): at 08:00

## 2018-02-14 RX ADMIN — LACTULOSE 10 GRAM(S): 10 SOLUTION ORAL at 11:51

## 2018-02-14 RX ADMIN — DULOXETINE HYDROCHLORIDE 60 MILLIGRAM(S): 30 CAPSULE, DELAYED RELEASE ORAL at 11:51

## 2018-02-14 RX ADMIN — CEFTRIAXONE 2000 MILLIGRAM(S): 500 INJECTION, POWDER, FOR SOLUTION INTRAMUSCULAR; INTRAVENOUS at 17:28

## 2018-02-14 RX ADMIN — Medication 100 MICROGRAM(S): at 05:56

## 2018-02-14 RX ADMIN — Medication 120 MILLIGRAM(S): at 05:55

## 2018-02-14 RX ADMIN — Medication 58.33 GRAM(S): at 09:13

## 2018-02-14 NOTE — PROGRESS NOTE ADULT - PROBLEM SELECTOR PLAN 7
Not in acute exacerbation. Not on maintenance inhalers in OP.   -continue to monitor Not in acute exacerbation. Not on maintenance inhalers in OP.   -continue to monitor    #constipation  -c/w lactulose

## 2018-02-14 NOTE — PROGRESS NOTE ADULT - PROBLEM SELECTOR PLAN 2
- suspected secondary to ITP from vancomycin   - platelets improved initially after s/p 3packs of platelets, 40mg IV decadron, and IVIG x 1 --> decreased again to 10K today --> receiving additional IVIG today  - f/u heme recs

## 2018-02-14 NOTE — PROGRESS NOTE ADULT - PROBLEM SELECTOR PLAN 1
- patient presented with Cr 2.03 (baseline 0.69 on 1/24/18) - differential includes pre-renal secondary to dehydration/vomiting vs intrinsic secondary to drug toxicity 2/2 elevated vancomycin level; less likely post-renal/obstructive as making good urine  - Cr improving to 1.44 today   - FENa 0.4% and BUN:Cr 50:1 more consistent with pre-renal process; no eosinophils on differential, less likely AIN   - please obtain U/A to evaluate for any evidence of protein, WBC, RBCs, or casts  - encourage PO hydration   - avoid nephrotoxic meds  - monitor I/Os and serial BMPs

## 2018-02-14 NOTE — PROGRESS NOTE ADULT - SUBJECTIVE AND OBJECTIVE BOX
OVERNIGHT EVENTS: ~9:30PM had episode of epistaxis, packed with gauze, stabilized, ordered for Oxymetazoline(Afrin) nasal spray 2puffs x1 (vasoconstricoting alpha agonist). Occurred few times overnight though minimal, stabilized    SUBJECTIVE: Patient reports feeling better overall. Able to tolerate PO intake well without vomiting. No additional complaints.     Vital Signs Last 12 Hrs  T(F): 98 (02-14-18 @ 05:59), Max: 98 (02-14-18 @ 05:59)  HR: 98 (02-13-18 @ 20:27) (98 - 98)  BP: 101/55 (02-13-18 @ 20:27) (101/55 - 101/55)  BP(mean): 73 (02-13-18 @ 20:27) (73 - 73)  RR: 18 (02-13-18 @ 20:27) (18 - 18)  SpO2: 95% (02-13-18 @ 20:27) (95% - 95%)  I&O's Summary    12 Feb 2018 07:01  -  13 Feb 2018 07:00  --------------------------------------------------------  IN: 0 mL / OUT: 400 mL / NET: -400 mL    PHYSICAL EXAM:  Constitutional: NAD, comfortable in bed.  HEENT: NC/AT, PERRLA, EOMI, no conjunctival pallor or scleral icterus, MMM, petechia in oropharynx, scab over lower lip   Neck: Supple, no JVD  Respiratory: Normal rate, rhythm, depth, effort. CTAB. No w/r/r.   Cardiovascular: RRR, normal S1 and S2, no m/r/g.   Gastrointestinal: +BS, soft NTND, no guarding or rebound tenderness, no palpable masses   Extremities: wwp; no cyanosis, clubbing or edema.   Vascular: Pulses equal and strong throughout.   Neurological: AAOx3, no CN deficits, strength and sensation intact throughout.   Skin: petechia on UE, LE, ecchymosis on back    LABS:                        7.6    5.8   )-----------( 10       ( 14 Feb 2018 05:56 )             22.8     02-14    139  |  100  |  49<H>  ----------------------------<  123<H>  3.4<L>   |  27  |  1.44<H>    Ca    9.0      14 Feb 2018 05:56  Phos  3.7     02-13  Mg     2.2     02-14    TPro  6.2  /  Alb  3.4  /  TBili  0.4  /  DBili  x   /  AST  37  /  ALT  34  /  AlkPhos  46  02-13  PT/INR - ( 13 Feb 2018 01:13 )   PT: 13.5 sec;   INR: 1.21     PTT - ( 13 Feb 2018 01:13 )  PTT:31.2 sec    RADIOLOGY & ADDITIONAL TESTS:    MEDICATIONS  (STANDING):  cefTRIAXone Injectable. 2000 milliGRAM(s) IV Push every 24 hours  dexamethasone  IVPB 40 milliGRAM(s) IV Intermittent daily  DULoxetine 60 milliGRAM(s) Oral daily  immune globulin gamma IVPB 35 Gram(s) IV Intermittent once  lactulose Syrup 10 Gram(s) Oral daily  levothyroxine 100 MICROGram(s) Oral daily    MEDICATIONS  (PRN): OVERNIGHT EVENTS: ~9:30PM had episode of epistaxis, packed with gauze, stabilized, ordered for Oxymetazoline(Afrin) nasal spray 2puffs x1 (vasoconstricoting alpha agonist). Occurred few times overnight though minimal, stabilized    SUBJECTIVE: Patient reports feeling better overall. Able to tolerate PO intake well without vomiting. No additional complaints.     Vital Signs Last 12 Hrs  T(F): 98 (02-14-18 @ 05:59), Max: 98 (02-14-18 @ 05:59)  HR: 98 (02-13-18 @ 20:27) (98 - 98)  BP: 101/55 (02-13-18 @ 20:27) (101/55 - 101/55)  BP(mean): 73 (02-13-18 @ 20:27) (73 - 73)  RR: 18 (02-13-18 @ 20:27) (18 - 18)  SpO2: 95% (02-13-18 @ 20:27) (95% - 95%)  I&O's Summary    12 Feb 2018 07:01  -  13 Feb 2018 07:00  --------------------------------------------------------  IN: 0 mL / OUT: 400 mL / NET: -400 mL    PHYSICAL EXAM:  Constitutional: NAD, comfortable in bed.  HEENT: NC/AT, PERRLA, EOMI, no conjunctival pallor or scleral icterus, MMM, petechia in oropharynx, scab over lower lip   Neck: Supple, no JVD  Respiratory: Normal rate, rhythm, depth, effort. CTAB. No w/r/r.   Cardiovascular: RRR, normal S1 and S2, no m/r/g.   Gastrointestinal: +BS, soft NTND, no guarding or rebound tenderness, no palpable masses   Extremities: wwp; no cyanosis, clubbing or edema.   Vascular: Pulses equal and strong throughout.   Neurological: AAOx3, no CN deficits, strength and sensation intact throughout.   Skin: petechia on UE, LE, and trunk. Ecchymosis on back    LABS:                        7.6    5.8   )-----------( 10       ( 14 Feb 2018 05:56 )             22.8     02-14    139  |  100  |  49<H>  ----------------------------<  123<H>  3.4<L>   |  27  |  1.44<H>    Ca    9.0      14 Feb 2018 05:56  Phos  3.7     02-13  Mg     2.2     02-14    TPro  6.2  /  Alb  3.4  /  TBili  0.4  /  DBili  x   /  AST  37  /  ALT  34  /  AlkPhos  46  02-13  PT/INR - ( 13 Feb 2018 01:13 )   PT: 13.5 sec;   INR: 1.21     PTT - ( 13 Feb 2018 01:13 )  PTT:31.2 sec    RADIOLOGY & ADDITIONAL TESTS:    MEDICATIONS  (STANDING):  cefTRIAXone Injectable. 2000 milliGRAM(s) IV Push every 24 hours  dexamethasone  IVPB 40 milliGRAM(s) IV Intermittent daily  DULoxetine 60 milliGRAM(s) Oral daily  immune globulin gamma IVPB 35 Gram(s) IV Intermittent once  lactulose Syrup 10 Gram(s) Oral daily  levothyroxine 100 MICROGram(s) Oral daily    MEDICATIONS  (PRN):

## 2018-02-14 NOTE — PROGRESS NOTE ADULT - SUBJECTIVE AND OBJECTIVE BOX
Interval Hx:  She is s/p IVIG 35 g and IV Dexamethasone 40 mg yesterday. She received 1 unit of platelets after IVIG treatment and plt count went up to 40K. Plt count back down to 10K this morning. She had 1 episode of epistaxis and coughing up a blood clot otherwise no active bleeding. Hb decreased slightly to 7.6 g/dL. No fevers/chills overnight; started on CTX per ID team for T11-T12 osteomyelitis     Initial HPI:  ESTEPHANIE FLORES is a 67y Female with history of HTN, HLD and hypothyroidism who presents with 5 day history of gum bleeding and sites of injection. She was seen at [facility] ED on 1/24 for back pain. At that time, her hb and platelet count were normal. She had an elevated WBC count. Renal function WNL. She was discharged on analgesics however back pain worsened and she presented to [facility] few days later. She was subsequently found to have a T11-T12 osteomyelitis with abscess collection. She was started on IV Vancomycin and Levaquin. She was discharged on 2/5 with PICC line and to continue IV abx as outpatient. Of note, she was on a SQ blood thinner likely heparin or lovenox during that admission. No thrombocytopenia on discharge. She says she has never had thrombocytopenia in the past.     Gum bleeding began on 2/8. She initially thought gum bleeding was due to brushing her teeth but it continued to persist throughout weekend. She also noted bruising in sites of blood draws as well as where she SQ heparin during prior admission. She called her PCP Dr. Moise Hayden who had coags drawn on 2/10. Coags showed slightly prolonged INR and he recommended to come into ER to have IV Vitamin K x 1 dose. She denies hemarthroses. No BRBPR or melena. Per ED team, she had negative guiac. No hematuria.     No fevers/chills or night sweats. She notes weight loss due to reduced appetite for 1 week but no unintentional weight loss over the past few months. No leg pain or swelling.    In the ED, she had 1 episode of epistaxis.         Medications:  MEDICATIONS  (STANDING):  cefTRIAXone Injectable. 2000 milliGRAM(s) IV Push every 24 hours  dexamethasone  IVPB 40 milliGRAM(s) IV Intermittent daily  DULoxetine 60 milliGRAM(s) Oral daily  lactulose Syrup 10 Gram(s) Oral daily  levothyroxine 100 MICROGram(s) Oral daily    MEDICATIONS  (PRN):      PHYSICAL EXAM:    T(C): 36.4 (02-13-18 @ 04:52), Max: 36.8 (02-13-18 @ 02:00)  T(F): 97.6 (02-13-18 @ 04:52), Max: 98.3 (02-13-18 @ 02:00)  HR: 90 (02-13-18 @ 08:26) (82 - 111)  BP: 115/58 (02-13-18 @ 08:26) (96/58 - 118/55)  ABP: --  ABP(mean): --  RR: 19 (02-13-18 @ 04:00) (17 - 19)  SpO2: 93% (02-13-18 @ 08:26) (93% - 98%)      Gen: well developed, well nourished. More somnolent today than usual however arousable  HEENT: NCAT, No conjunctival pallor, no scleral icterus. Dried up blood noted on lips and on gum as well as fresh blood in gum. Petechiae noted in the orophraynx  Neck: supple, no JVD  Lymph nodes: No cervical, axillary or inguinal adenopathy  Cardiovascular: RR, nl S1S2, no murmurs/rubs/gallops  Respiratory: clear air entry b/l  Gastrointestinal: BS+, soft, NT/ND, no masses, no splenomegaly, no hepatomegaly, no evidence for ascites  Extremities: no clubbing/cyanosis, no edema, no calf tenderness  Vascular:  DP/PT 2+ b/l  Neurological: CN 2-12 grossly intact, no focal deficits; 5/5 strength in upper and lower extremities   Skin: no rash however ecchymoses noted bilaterally in upper extremities   BacK: no petechiae noted in lower back however ecchymoses noted on buttocks        Labs:                          7.6    5.8   )-----------( 10       ( 14 Feb 2018 05:56 )             22.8         CBC Full  -  ( 14 Feb 2018 05:56 )  WBC Count : 5.8 K/uL  Hemoglobin : 7.6 g/dL  Hematocrit : 22.8 %  Platelet Count - Automated : 10 K/uL  Mean Cell Volume : 88.7 fL  Mean Cell Hemoglobin : 29.6 pg  Mean Cell Hemoglobin Concentration : 33.3 g/dL  Auto Neutrophil # : x  Auto Lymphocyte # : x  Auto Monocyte # : x  Auto Eosinophil # : x  Auto Basophil # : x  Auto Neutrophil % : x  Auto Lymphocyte % : x  Auto Monocyte % : x  Auto Eosinophil % : x  Auto Basophil % : x        02-14    139  |  100  |  49<H>  ----------------------------<  123<H>  3.4<L>   |  27  |  1.44<H>    Ca    9.0      14 Feb 2018 05:56  Phos  3.7     02-13  Mg     2.2     02-14    TPro  6.2  /  Alb  3.4  /  TBili  0.4  /  DBili  x   /  AST  37  /  ALT  34  /  AlkPhos  46  02-13        PT/INR - ( 13 Feb 2018 01:13 )   PT: 13.5 sec;   INR: 1.21          PTT - ( 13 Feb 2018 01:13 )  PTT:31.2 sec

## 2018-02-14 NOTE — PROGRESS NOTE ADULT - ASSESSMENT
66 yo F with HTN, Hypothyroidism, Asthma with recent hospitalization for Strep Viridans bacteremia and suspected T11-12 OM/Discitis presents from home with bleeding gums and epistaxis, was found to have platelets 2K and Cr 2.03, admitted for further workup

## 2018-02-14 NOTE — PROGRESS NOTE ADULT - ASSESSMENT
67 year old F who presents with 5 day history of gum bleeding and bruising in upper extremities and on further workup is found to have severe thrombocytopenia with platelet count of 2K. No CNS bleed or active bleeding episodes however pt having gum bleeding and epistaxis. Differential diagnosis likely drug-induced ITP secondary to Vancomycin however will r/o HIT and TTP.    Peripheral smear reviewed:   RBC's: numerous micro-spherocytes, no shistocytes.   Platelets: NO platelets noted on peripheral smear; no evidence of clumping  WBC's: Pseudo-Pelger-Huet cells noted, no blasts. 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 She is likely platelet refractory given no interval increase in platelet count after 2 units. She is s/p IV Dexamethasone and IVIG. She received 1 more unit of platelets yesterday with appropriate rise in plt count. Plt count back down to 10K, 1 episode of epistaxis noted overnight.     -Decadron 40 mg IV x 4 days (started on 2/12)  -Agree with repeating IVIG treatment today; 35 g which will be infused slowly over 6 hours   -Hold off on platelet transfusion; recommend repeating CBC few hours after IVIG treatment. If Plt count remains 10K, transfuse 1 more unit of platelets and recheck plt count 30 mins to 1 hour later. We have to evaluate for platelet refractoriness and if she is in fact truly platelet refractory, we will need to do HLA testing to order her HLA matched platelets.   -No HIT PF-4 antibodies resulted; please check if not done already as it is very rare for HIT to cause plt count to be <20K however can be seen  -f/u [id number] (likely to be neg)  -Will discuss with blood bank about sending vancomycin-dependent platelet antibody testing to confirm (not necessary to make diagnosis however would be helpful for the future)    #Anemia  Normocytic and new-onset. Anemia worsened since yesterday. Pt had no underlying anemia on labs on 1/24. No evidence of tobi hemolysis given elevated hapto, normal retic and bili and no shistocytes noted. Nieves negative. Most likely due to blood loss.   -Primary team transfusing 1 unit pRBC given acute drop in Hb, will f/u post transfusion CBC  -Iron studies show anemia of chronic disease or inflammation given low TIBC and significantly increased ferritin. B12 and folate WNL.       Case discussed w/ Dr. Dunlap

## 2018-02-14 NOTE — PROGRESS NOTE ADULT - ASSESSMENT
66 yo F with PMHx of hypothyroid, HTN, asthma sent to ED by PCP for spontaneous gum bleeding, epistaxis, found to have WIN and new thrombocytopenia (Plts 2K), suspicious for drug-induced thrombocytopenia/ ITP in setting of new abx (vanc/ levaquin). Vancomycin and Levaquin have been stopped.    Recommend:  - C/w Ceftriaxone 2g IV q24hrs for treatment of vertebral osteomyelitis, though beta lactam Abx can also have myelosuppressive effects  - ESR 80. Please obtain baseline CRP and then weekly ESR and CRP. 66 yo F with PMHx of hypothyroid, HTN, asthma sent to ED by PCP for spontaneous gum bleeding, epistaxis, found to have WIN and new thrombocytopenia (Plts 2K), suspicious for drug-induced thrombocytopenia/ ITP in setting of new abx (vanc/ levaquin) for presumed viridans strep T11-12 OM. Vancomycin and Levaquin have been stopped.    Recommend:  - C/w Ceftriaxone 2g IV q24hrs for treatment of vertebral osteomyelitis, though beta lactam Abx can also have myelosuppressive effects  - ESR 80. Please obtain baseline CRP and then weekly ESR and CRP.

## 2018-02-14 NOTE — PROGRESS NOTE ADULT - SUBJECTIVE AND OBJECTIVE BOX
INTERVAL HPI/OVERNIGHT EVENTS:    OVERNIGHT: No overnight events.  SUBJECTIVE: Patient seen and examined at bedside.     REVIEW OF SYSTEMS:    CONSTITUTIONAL: No weakness, fevers or chills  EYES/ENT: No visual changes;  No vertigo or throat pain   NECK: No pain or stiffness  RESPIRATORY: No cough, wheezing, hemoptysis; No shortness of breath  CARDIOVASCULAR: No chest pain or palpitations  GASTROINTESTINAL: No abdominal or epigastric pain. No nausea, vomiting, or hematemesis; No diarrhea or constipation. No melena or hematochezia.  GENITOURINARY: No dysuria, frequency or hematuria  NEUROLOGICAL: No numbness or weakness  SKIN: No itching, burning, rashes, or lesions   MSK: no joint pain, no joint swelling  All other review of systems is negative unless indicated above.      Allergies    No Known Allergies    Intolerances        ANTIBIOTICS/RELEVANT:  antimicrobials  cefTRIAXone Injectable. 2000 milliGRAM(s) IV Push every 24 hours    immunologic:    OTHER:  dexamethasone  IVPB 40 milliGRAM(s) IV Intermittent daily  DULoxetine 60 milliGRAM(s) Oral daily  lactulose Syrup 10 Gram(s) Oral daily  levothyroxine 100 MICROGram(s) Oral daily      Objective:  Vital Signs Last 24 Hrs  T(C): 36.4 (14 Feb 2018 09:25), Max: 36.8 (13 Feb 2018 17:00)  T(F): 97.6 (14 Feb 2018 09:25), Max: 98.3 (13 Feb 2018 17:00)  HR: 98 (14 Feb 2018 09:12) (88 - 102)  BP: 127/60 (14 Feb 2018 09:12) (101/55 - 129/61)  BP(mean): 86 (14 Feb 2018 09:12) (73 - 88)  RR: 16 (14 Feb 2018 09:12) (16 - 20)  SpO2: 95% (14 Feb 2018 09:12) (92% - 95%)      PHYSICAL EXAM:    Constitutional: WDWN resting comfortably in bed; NAD  Head: NC/AT  Eyes: PERRLA, EOMI, clear conjunctiva  ENT: no nasal discharge; no oropharyngeal erythema or exudates; dry oral mucosa  Neck: supple; no JVD or thyromegaly  Respiratory: CTA B/L; no W/R/R, no retractions  Cardiac: +S1/S2; RRR; no M/R/G; PMI non-displaced  Gastrointestinal: soft, NT/ND; no rebound or guarding; +BS, no hepatosplenomegaly  Extremities: WWP, no clubbing or cyanosis; no peripheral edema  Vascular: 2+ radial, DP/PT pulses B/L  Lymphatic: no submandibular or cervical LAD  Skin: no rash, no ulcers  Neurologic: AAOx3; answers questions appropriately, follows commands, moves all extremities, CNII-XII grossly intact; no focal deficits, motor 5/5 in UE and LE, Reflexes 2+ in UE and LE b/l        LABS:                        7.6    5.8   )-----------( 10       ( 14 Feb 2018 05:56 )             22.8     02-14    139  |  100  |  49<H>  ----------------------------<  123<H>  3.4<L>   |  27  |  1.44<H>    Ca    9.0      14 Feb 2018 05:56  Phos  3.7     02-13  Mg     2.2     02-14    TPro  6.2  /  Alb  3.4  /  TBili  0.4  /  DBili  x   /  AST  37  /  ALT  34  /  AlkPhos  46  02-13    PT/INR - ( 13 Feb 2018 01:13 )   PT: 13.5 sec;   INR: 1.21          PTT - ( 13 Feb 2018 01:13 )  PTT:31.2 sec      MICROBIOLOGY:            RECENT CULTURES:      RADIOLOGY & ADDITIONAL STUDIES: INTERVAL HPI/OVERNIGHT EVENTS: Pt remains afebrile, had 1 episode of epistaxis last night. Received IVIG, decadron then 1 unit plts with improvement to 41K. This AM, plts 10K      SUBJECTIVE: Patient seen and examined at bedside. Pt has no complaints this morning, feels sleepy. She does not notice much back pain.     REVIEW OF SYSTEMS:    CONSTITUTIONAL: No weakness, fevers or chills  EYES/ENT: No visual changes;  No vertigo or throat pain   NECK: No pain or stiffness  RESPIRATORY: No cough, wheezing, hemoptysis; No shortness of breath  CARDIOVASCULAR: No chest pain or palpitations  GASTROINTESTINAL: No abdominal or epigastric pain. No nausea, vomiting, or hematemesis; No diarrhea or constipation. No melena or hematochezia.  GENITOURINARY: No dysuria, frequency or hematuria  NEUROLOGICAL: No numbness or weakness  SKIN: No itching, burning, rashes, or lesions   MSK: no joint pain, no joint swelling  All other review of systems is negative unless indicated above.      Allergies    No Known Allergies    Intolerances        ANTIBIOTICS/RELEVANT:  antimicrobials  cefTRIAXone Injectable. 2000 milliGRAM(s) IV Push every 24 hours    immunologic:    OTHER:  dexamethasone  IVPB 40 milliGRAM(s) IV Intermittent daily  DULoxetine 60 milliGRAM(s) Oral daily  lactulose Syrup 10 Gram(s) Oral daily  levothyroxine 100 MICROGram(s) Oral daily      Objective:  Vital Signs Last 24 Hrs  T(C): 36.4 (14 Feb 2018 09:25), Max: 36.8 (13 Feb 2018 17:00)  T(F): 97.6 (14 Feb 2018 09:25), Max: 98.3 (13 Feb 2018 17:00)  HR: 98 (14 Feb 2018 09:12) (88 - 102)  BP: 127/60 (14 Feb 2018 09:12) (101/55 - 129/61)  BP(mean): 86 (14 Feb 2018 09:12) (73 - 88)  RR: 16 (14 Feb 2018 09:12) (16 - 20)  SpO2: 95% (14 Feb 2018 09:12) (92% - 95%)      PHYSICAL EXAM:    Constitutional: WDWN, NAD, comfortable in bed  HEENT: NC/AT, PERRLA, EOMI, no conjunctival pallor or scleral icterus, MMM, petechia in oropharynx   Neck: Supple, no JVD.  Respiratory: CTA b/l, no W/R/R  Cardiovascular: RRR, normal S1 and S2, no M/R/G  Gastrointestinal: +BS, soft NTND, no guarding or rebound tenderness, no palpable masses   Back: thoracic spine (approximately t10-12) mildly tender to palpation. No step-offs  Extremities: WWP, no cyanosis, clubbing or edema.   Vascular: 2+ radial bl, DP/PT 2+ b/l  Neurological: AAOx3, no focal deficits, strength and sensation intact throughout.   Skin: scattered petechiae and ecchymoses on UE, LE and back and abdomen          LABS:                        7.6    5.8   )-----------( 10       ( 14 Feb 2018 05:56 )             22.8     02-14    139  |  100  |  49<H>  ----------------------------<  123<H>  3.4<L>   |  27  |  1.44<H>    Ca    9.0      14 Feb 2018 05:56  Phos  3.7     02-13  Mg     2.2     02-14    TPro  6.2  /  Alb  3.4  /  TBili  0.4  /  DBili  x   /  AST  37  /  ALT  34  /  AlkPhos  46  02-13    PT/INR - ( 13 Feb 2018 01:13 )   PT: 13.5 sec;   INR: 1.21          PTT - ( 13 Feb 2018 01:13 )  PTT:31.2 sec      MICROBIOLOGY:            RECENT CULTURES:      RADIOLOGY & ADDITIONAL STUDIES:

## 2018-02-14 NOTE — PROGRESS NOTE ADULT - ATTENDING COMMENTS
Hope to start to see recovery in plt count as vancomycin clearing from blood with improved renal function. Continue ceftriaxone. Inflammatory markers as above.

## 2018-02-14 NOTE — PROGRESS NOTE ADULT - PROBLEM SELECTOR PLAN 1
Presenting with platelet count of 2k with bleeding gums and diffuse petechial rash. 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  -Heme consulted by ED, f/u recs  -mildly increased fibrinogen, TSH low,  -f/u [id number], serotonin assay   -c/w decadron 40mg for 2 more days  -s/p 35mg over 6 hours for renal impairment, will receive additional dose today  -will receive 1 Unit platelets following IVIG and will f/u CBC post transfusion  -will hold off on further heparin and SCDs  -fall precautions

## 2018-02-14 NOTE — PROGRESS NOTE ADULT - PROBLEM SELECTOR PLAN 2
Per patient, biopsy was negative at previous hospitalization, however found to be bacteremic. Per [facility], patient bacteremic with pan-sensitive strep viridans but discharged on Levaquin and vancomycin 2/2 esr decrease on these abx  -[facility] record in chart  -per ID, start ceftriaxone 2g IV Q24, Day 2

## 2018-02-14 NOTE — PROGRESS NOTE ADULT - ATTENDING COMMENTS
Pt with stable VSS and epistaxis overnight noted. Repeat platelet count 10K and Heme plans noted for IVIG and continued steroids. Repeat CBC post one unit PRBC. Pt with stable VSS and epistaxis overnight noted. Repeat platelet count 10K and Heme plans noted for IVIG and continued steroids. Repeat CBC post one unit PRBC. Vanco level decreasing. HD discussed with renal and Heme. Heme to hold off for now. Repeat Vanco level.

## 2018-02-14 NOTE — PROGRESS NOTE ADULT - ATTENDING COMMENTS
Patient examined, resident's hx and PE reviewed and confirmed. I find WIN improved, slight hypokalemia. A/P reviewed and confirmed. Follow SCr. Keep K>4 mg>2. See full note.

## 2018-02-14 NOTE — PROGRESS NOTE ADULT - SUBJECTIVE AND OBJECTIVE BOX
Patient is a 67y Female seen and evaluated at bedside. Reports feeling somewhat better today. Had episode of epistaxis last night and used packing to stop bleeding. Has some intermittent coughing, one time with blood clot. Has been urinating well, tolerating PO, pain is improved. Denies fever, chills, n/v, headache, chest pain, dyspnea, abdominal pain, or edema. Platelets back down to 10K this morning, received additional dose of IVIG and 1U pRBC. Cr improved to 1.44.       cefTRIAXone Injectable. 2000 every 24 hours  dexamethasone  IVPB 40 daily  DULoxetine 60 daily  lactulose Syrup 10 daily  levothyroxine 100 daily      Allergies    No Known Allergies    Intolerances        T(C): , Max: 36.8 (02-13-18 @ 17:00)  T(F): , Max: 98.3 (02-13-18 @ 17:00)  HR: 94 (02-14-18 @ 13:35)  BP: 127/64 (02-14-18 @ 13:35)  BP(mean): 86 (02-14-18 @ 09:12)  RR: 20 (02-14-18 @ 13:35)  SpO2: 96% (02-14-18 @ 13:35)  Wt(kg): --        PHYSICAL EXAM:  Constitutional: WDWN resting comfortably in bed; NAD  Eyes: PERRL, EOMI, clear conjunctiva  ENT: no nasal discharge; uvula midline, MMM; petechial on hard palate and oral mucosa  Neck: supple; no JVD or thyromegaly  Respiratory: CTA B/L; no W/R/R, no retractions  Cardiac: +S1/S2; RRR; no M/R/G; PMI non-displaced  Gastrointestinal: soft, NT/ND; no rebound or guarding; +BSx4  Genitourinary: no CVA tenderness   Extremities: WWP, no clubbing or cyanosis; no peripheral edema  Musculoskeletal: NROM x4; no joint swelling, tenderness or erythema  Vascular: 2+ radial, femoral, DP/PT pulses B/L  Dermatologic: scattered nonblanching petechia throughout UE and LE  Lymphatic: no submandibular or cervical LAD  Neurologic: AAOx3; CNII-XII grossly intact; no focal deficits          ACCESS:     LABS:                        7.6    5.8   )-----------( 10       ( 14 Feb 2018 05:56 )             22.8     02-14    139  |  100  |  49<H>  ----------------------------<  123<H>  3.4<L>   |  27  |  1.44<H>    Ca    9.0      14 Feb 2018 05:56  Phos  3.7     02-13  Mg     2.2     02-14    TPro  6.2  /  Alb  3.4  /  TBili  0.4  /  DBili  x   /  AST  37  /  ALT  34  /  AlkPhos  46  02-13      PT/INR - ( 13 Feb 2018 01:13 )   PT: 13.5 sec;   INR: 1.21          PTT - ( 13 Feb 2018 01:13 )  PTT:31.2 sec    Creatinine, Random Urine: 93 mg/dL (02-12 @ 22:28)  Sodium, Random Urine: 26 mmol/L (02-12 @ 22:28)        RADIOLOGY & ADDITIONAL STUDIES:

## 2018-02-14 NOTE — PROGRESS NOTE ADULT - ASSESSMENT
66 yo female pmh of hypothyroid, HTN, who was sent to the ED for spontaneous gum bleeding, found to have platelet of 2k, likely decreased 2/2 Vancomycin induced immune thrombocytopenia.

## 2018-02-15 LAB
ADAMTS 13 ACTIVITY REFLEX: SIGNIFICANT CHANGE UP
ANION GAP SERPL CALC-SCNC: 12 MMOL/L — SIGNIFICANT CHANGE UP (ref 5–17)
BUN SERPL-MCNC: 40 MG/DL — HIGH (ref 7–23)
CALCIUM SERPL-MCNC: 9.1 MG/DL — SIGNIFICANT CHANGE UP (ref 8.4–10.5)
CHLORIDE SERPL-SCNC: 101 MMOL/L — SIGNIFICANT CHANGE UP (ref 96–108)
CO2 SERPL-SCNC: 28 MMOL/L — SIGNIFICANT CHANGE UP (ref 22–31)
CREAT SERPL-MCNC: 1.2 MG/DL — SIGNIFICANT CHANGE UP (ref 0.5–1.3)
CRP SERPL-MCNC: 0.83 MG/DL — HIGH (ref 0–0.4)
ERYTHROCYTE [SEDIMENTATION RATE] IN BLOOD: 77 MM/HR — HIGH
GLUCOSE SERPL-MCNC: 103 MG/DL — HIGH (ref 70–99)
HCT VFR BLD CALC: 26.9 % — LOW (ref 34.5–45)
HCT VFR BLD CALC: 27.7 % — LOW (ref 34.5–45)
HGB BLD-MCNC: 9 G/DL — LOW (ref 11.5–15.5)
HGB BLD-MCNC: 9.4 G/DL — LOW (ref 11.5–15.5)
MAGNESIUM SERPL-MCNC: 2.1 MG/DL — SIGNIFICANT CHANGE UP (ref 1.6–2.6)
MCHC RBC-ENTMCNC: 30 PG — SIGNIFICANT CHANGE UP (ref 27–34)
MCHC RBC-ENTMCNC: 30.2 PG — SIGNIFICANT CHANGE UP (ref 27–34)
MCHC RBC-ENTMCNC: 33.5 G/DL — SIGNIFICANT CHANGE UP (ref 32–36)
MCHC RBC-ENTMCNC: 33.9 G/DL — SIGNIFICANT CHANGE UP (ref 32–36)
MCV RBC AUTO: 89.1 FL — SIGNIFICANT CHANGE UP (ref 80–100)
MCV RBC AUTO: 89.7 FL — SIGNIFICANT CHANGE UP (ref 80–100)
PF4 HEPARIN CMPLX AB SER-ACNC: SIGNIFICANT CHANGE UP
PF4 HEPARIN CMPLX AB SERPL QL IA: POSITIVE
PHOSPHATE SERPL-MCNC: 2.9 MG/DL — SIGNIFICANT CHANGE UP (ref 2.5–4.5)
PLATELET # BLD AUTO: 35 K/UL — LOW (ref 150–400)
PLATELET # BLD AUTO: 56 K/UL — LOW (ref 150–400)
POTASSIUM SERPL-MCNC: 4 MMOL/L — SIGNIFICANT CHANGE UP (ref 3.5–5.3)
POTASSIUM SERPL-SCNC: 4 MMOL/L — SIGNIFICANT CHANGE UP (ref 3.5–5.3)
RBC # BLD: 3 M/UL — LOW (ref 3.8–5.2)
RBC # BLD: 3.11 M/UL — LOW (ref 3.8–5.2)
RBC # FLD: 13 % — SIGNIFICANT CHANGE UP (ref 10.3–16.9)
RBC # FLD: 13.1 % — SIGNIFICANT CHANGE UP (ref 10.3–16.9)
SODIUM SERPL-SCNC: 141 MMOL/L — SIGNIFICANT CHANGE UP (ref 135–145)
SRA INTERP SER-IMP: SIGNIFICANT CHANGE UP
VANCOMYCIN TROUGH SERPL-MCNC: 9 UG/ML — LOW (ref 10–20)
WBC # BLD: 5.7 K/UL — SIGNIFICANT CHANGE UP (ref 3.8–10.5)
WBC # BLD: 7.2 K/UL — SIGNIFICANT CHANGE UP (ref 3.8–10.5)
WBC # FLD AUTO: 5.7 K/UL — SIGNIFICANT CHANGE UP (ref 3.8–10.5)
WBC # FLD AUTO: 7.2 K/UL — SIGNIFICANT CHANGE UP (ref 3.8–10.5)

## 2018-02-15 PROCEDURE — 99233 SBSQ HOSP IP/OBS HIGH 50: CPT

## 2018-02-15 PROCEDURE — 99233 SBSQ HOSP IP/OBS HIGH 50: CPT | Mod: GC

## 2018-02-15 PROCEDURE — 99232 SBSQ HOSP IP/OBS MODERATE 35: CPT | Mod: GC

## 2018-02-15 RX ORDER — ACETAMINOPHEN 500 MG
650 TABLET ORAL ONCE
Qty: 0 | Refills: 0 | Status: COMPLETED | OUTPATIENT
Start: 2018-02-15 | End: 2018-02-15

## 2018-02-15 RX ORDER — CEFTRIAXONE 500 MG/1
2 INJECTION, POWDER, FOR SOLUTION INTRAMUSCULAR; INTRAVENOUS
Qty: 44 | Refills: 0 | OUTPATIENT
Start: 2018-02-15 | End: 2018-03-08

## 2018-02-15 RX ADMIN — Medication 100 MICROGRAM(S): at 06:42

## 2018-02-15 RX ADMIN — DULOXETINE HYDROCHLORIDE 60 MILLIGRAM(S): 30 CAPSULE, DELAYED RELEASE ORAL at 11:24

## 2018-02-15 RX ADMIN — Medication 10 MILLIGRAM(S): at 10:03

## 2018-02-15 RX ADMIN — LACTULOSE 10 GRAM(S): 10 SOLUTION ORAL at 11:23

## 2018-02-15 RX ADMIN — Medication 650 MILLIGRAM(S): at 19:50

## 2018-02-15 RX ADMIN — Medication 650 MILLIGRAM(S): at 19:20

## 2018-02-15 RX ADMIN — CEFTRIAXONE 2000 MILLIGRAM(S): 500 INJECTION, POWDER, FOR SOLUTION INTRAMUSCULAR; INTRAVENOUS at 15:49

## 2018-02-15 RX ADMIN — Medication 120 MILLIGRAM(S): at 06:00

## 2018-02-15 NOTE — PROGRESS NOTE ADULT - ASSESSMENT
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

## 2018-02-15 NOTE — PROGRESS NOTE ADULT - PROBLEM SELECTOR PLAN 2
Per patient, biopsy was negative at previous hospitalization, however found to be bacteremic. Per [facility], patient bacteremic with pan-sensitive strep viridans but discharged on Levaquin and vancomycin 2/2 esr decrease on these abx  -[facility] record in chart  -per ID, start ceftriaxone 2g IV Q24, Day 3  -trending ESR/CRP daily   -patient wishes to follow up with Dr. Iglesias for treatment of OM in outpatient

## 2018-02-15 NOTE — PROGRESS NOTE ADULT - SUBJECTIVE AND OBJECTIVE BOX
OVERNIGHT EVENTS: Patient's repeat CBC shows platelets uptrending.     SUBJECTIVE:     Vital Signs Last 12 Hrs  T(F): 96.7 (02-15-18 @ 06:11), Max: 98.1 (02-14-18 @ 22:00)  HR: 84 (02-15-18 @ 05:43) (84 - 92)  BP: 145/68 (02-15-18 @ 05:43) (145/68 - 159/72)  BP(mean): 98 (02-15-18 @ 05:43) (98 - 105)  RR: 19 (02-15-18 @ 05:43) (16 - 23)  SpO2: 95% (02-15-18 @ 05:43) (95% - 98%)  I&O's Summary    14 Feb 2018 07:01  -  15 Feb 2018 06:53  --------------------------------------------------------  IN: 50 mL / OUT: 800 mL / NET: -750 mL    PHYSICAL EXAM:  Constitutional: NAD, comfortable in bed.  HEENT: NC/AT, PERRLA, EOMI, no conjunctival pallor or scleral icterus, MMM  Neck: Supple, no JVD  Respiratory: Normal rate, rhythm, depth, effort. CTAB. No w/r/r.   Cardiovascular: RRR, normal S1 and S2, no m/r/g.   Gastrointestinal: +BS, soft NTND, no guarding or rebound tenderness, no palpable masses   Extremities: wwp; no cyanosis, clubbing or edema.   Vascular: Pulses equal and strong throughout.   Neurological: AAOx3, no CN deficits, strength and sensation intact throughout.   Skin: No gross skin abnormalities or rashes    LABS:                        9.0    7.2   )-----------( 35       ( 15 Feb 2018 05:55 )             26.9     02-15    141  |  101  |  40<H>  ----------------------------<  103<H>  4.0   |  28  |  1.20    Ca    9.1      15 Feb 2018 05:55  Phos  2.9     02-15  Mg     2.1     02-15    PT/INR - ( 14 Feb 2018 20:47 )   PT: 12.3 sec;   INR: 1.11     PTT - ( 14 Feb 2018 20:47 )  PTT:28.2 sec    RADIOLOGY & ADDITIONAL TESTS:    MEDICATIONS  (STANDING):  cefTRIAXone Injectable. 2000 milliGRAM(s) IV Push every 24 hours  DULoxetine 60 milliGRAM(s) Oral daily  lactulose Syrup 10 Gram(s) Oral daily  levothyroxine 100 MICROGram(s) Oral daily    MEDICATIONS  (PRN): TRANSFER NOTE [facility] to [facility]  HOSPITAL COURSE  Patient is a 66 yo female PMHx of hypothyroid, HTN, asthma who was sent to the ED for abnormal lab. Patient was recently discharged from [facility] for spinal OM on imaging but not on biopsy (biopsy after abx) with PICC line for IV vancomycin/Levaquin, heparin flushes. Per [facility]/Chip, the patient was being treated for pansensitive strep viridans.  She presented to today for gum bleeding that started on Wednesday, spontaneous, multiple episodes, and with epistaxis possibly for the past three days but noticed today. As per outpatient records: EDER WNL no vegetation, normal LV and RV size and function. Atherosclerotic disease of the aorta. MRI evidence of T11-T12 discitis/osteomyelitis with epidural phlegmon and small abscess, w/out significant cord compression. Possible ileus on 1/26. In the ED, she was afebrile, , BP 96/58, sat 97% on RA. CT head was negative for intracranial bleed. She was noticed to have platelet of 2, hematology was consulted and reviewed the smear, 1 unit of platelet ordered. She was given Xanax 0.5mg and started on a 4 day course of decadron 40mg daily. ICU was consulted and the patient was transferred to [facility] for further management. Per Heme, the thrombocytopenia was likely 2/2 Vancomycin induced immune thrombocytopenia, and her OP Abx were not continued. HIT labs were additionally sent. Following 2 Units of platelet transfusion, the patient continued to have a platelet count o f 2K. The patient was given IVIG 35mg, half dose 2/2 renal impairment, and was subsequently transfused an additional 1U Platelets with f/u CBC showing increase in platelets to 40. ID was consulted for continued treatment of OM and the patient was started on Ceftriaxone 2g daily, with plans to trend ESR and CRP to measure response. The following day platelets were decreased to 10k and she was given an additional treatment of IVIG 35mg and transfused 1U PRBC as Hgb was slowly downtrending and she is at increased risk for hemorrhage. A post transfusion CBC showed good response to PRBC and uptrending platelets that continued to the next morning. The patient showed no further signs of active bleeding in epistaxis or otherwise and was cleared for stepdown to [facility] for further management.     OVERNIGHT EVENTS: Patient's repeat CBC shows platelets uptrending.     SUBJECTIVE: Patient reports feeling much better, states she had no further episodes of bleeding or epistaxis. No additional complaints.     Vital Signs Last 12 Hrs  T(F): 96.7 (02-15-18 @ 06:11), Max: 98.1 (02-14-18 @ 22:00)  HR: 84 (02-15-18 @ 05:43) (84 - 92)  BP: 145/68 (02-15-18 @ 05:43) (145/68 - 159/72)  BP(mean): 98 (02-15-18 @ 05:43) (98 - 105)  RR: 19 (02-15-18 @ 05:43) (16 - 23)  SpO2: 95% (02-15-18 @ 05:43) (95% - 98%)  I&O's Summary    14 Feb 2018 07:01  -  15 Feb 2018 06:53  --------------------------------------------------------  IN: 50 mL / OUT: 800 mL / NET: -750 mL    PHYSICAL EXAM:  Constitutional: NAD, comfortable in bed.  HEENT: NC/AT, PERRLA, EOMI, no conjunctival pallor or scleral icterus, MMM, improved petechia in oropharynx, improved scab over lower lip   Neck: Supple, no JVD  Respiratory: Normal rate, rhythm, depth, effort. CTAB. No w/r/r.   Cardiovascular: RRR, normal S1 and S2, no m/r/g.   Gastrointestinal: +BS, soft NTND, no guarding or rebound tenderness, no palpable masses   Extremities: wwp; no cyanosis, clubbing or edema.   Vascular: Pulses equal and strong throughout.   Neurological: AAOx3, no CN deficits, strength and sensation intact throughout.   Skin: improved petechia on UE, LE, and trunk.     LABS:                        9.0    7.2   )-----------( 35       ( 15 Feb 2018 05:55 )             26.9     02-15    141  |  101  |  40<H>  ----------------------------<  103<H>  4.0   |  28  |  1.20    Ca    9.1      15 Feb 2018 05:55  Phos  2.9     02-15  Mg     2.1     02-15    PT/INR - ( 14 Feb 2018 20:47 )   PT: 12.3 sec;   INR: 1.11     PTT - ( 14 Feb 2018 20:47 )  PTT:28.2 sec    RADIOLOGY & ADDITIONAL TESTS:    MEDICATIONS  (STANDING):  cefTRIAXone Injectable. 2000 milliGRAM(s) IV Push every 24 hours  DULoxetine 60 milliGRAM(s) Oral daily  lactulose Syrup 10 Gram(s) Oral daily  levothyroxine 100 MICROGram(s) Oral daily    MEDICATIONS  (PRN):

## 2018-02-15 NOTE — PROGRESS NOTE ADULT - PROBLEM SELECTOR PLAN 7
Not in acute exacerbation. Not on maintenance inhalers in OP.   -continue to monitor    #constipation  -c/w lactulose

## 2018-02-15 NOTE — PROGRESS NOTE ADULT - SUBJECTIVE AND OBJECTIVE BOX
INTERVAL HPI/OVERNIGHT EVENTS:     OVERNIGHT: No overnight events. Pt remains afebrile. Ptt has received 2 IVIG treatments so far, Plts improving (35K today)    SUBJECTIVE: Patient seen and examined at bedside. Pt feels well, has no complaints. Pt reports back pain is minimal, does not bother her.    REVIEW OF SYSTEMS:    CONSTITUTIONAL: No weakness, fevers or chills  EYES/ENT: No visual changes;  No vertigo or throat pain   NECK: No pain or stiffness  RESPIRATORY: No cough, wheezing, hemoptysis; No shortness of breath  CARDIOVASCULAR: No chest pain or palpitations  GASTROINTESTINAL: No abdominal or epigastric pain. No nausea, vomiting, or hematemesis; No diarrhea or constipation. No melena or hematochezia.  GENITOURINARY: No dysuria, frequency or hematuria  NEUROLOGICAL: No numbness or weakness  SKIN: No itching, burning, rashes, or lesions   MSK: no joint pain, no joint swelling  All other review of systems is negative unless indicated above.      Allergies    Levaquin (Other (Severe))  vancomycin (Other (Severe))    Intolerances        ANTIBIOTICS/RELEVANT:  antimicrobials  cefTRIAXone Injectable. 2000 milliGRAM(s) IV Push every 24 hours    immunologic:    OTHER:  DULoxetine 60 milliGRAM(s) Oral daily  enalapril 10 milliGRAM(s) Oral daily  lactulose Syrup 10 Gram(s) Oral daily  levothyroxine 100 MICROGram(s) Oral daily      Objective:  Vital Signs Last 24 Hrs  T(C): 36.6 (15 Feb 2018 09:10), Max: 36.7 (14 Feb 2018 22:00)  T(F): 97.9 (15 Feb 2018 09:10), Max: 98.1 (14 Feb 2018 22:00)  HR: 90 (15 Feb 2018 11:07) (82 - 97)  BP: 158/71 (15 Feb 2018 11:07) (127/64 - 167/81)  BP(mean): 109 (15 Feb 2018 10:02) (92 - 116)  RR: 18 (15 Feb 2018 11:07) (16 - 23)  SpO2: 97% (15 Feb 2018 11:07) (95% - 98%)      PHYSICAL EXAM:    Constitutional: WDWN resting comfortably in bed; NAD  Head: NC/AT  Eyes: PERRLA, EOMI, clear conjunctiva  ENT: no nasal discharge; no oropharyngeal erythema or exudates; dry oral mucosa  Neck: supple; no JVD or thyromegaly  Respiratory: CTA B/L; no W/R/R, no retractions  Cardiac: +S1/S2; RRR; no M/R/G; PMI non-displaced  Gastrointestinal: soft, NT/ND; no rebound or guarding; +BS, no hepatosplenomegaly  Extremities: WWP, no clubbing or cyanosis; no peripheral edema  Vascular: 2+ radial, DP/PT pulses B/L  Lymphatic: no submandibular or cervical LAD  Skin: no rash, no ulcers  Neurologic: AAOx3; answers questions appropriately, follows commands, moves all extremities, CNII-XII grossly intact; no focal deficits, motor 5/5 in UE and LE, Reflexes 2+ in UE and LE b/l        LABS:                        9.0    7.2   )-----------( 35       ( 15 Feb 2018 05:55 )             26.9     02-15    141  |  101  |  40<H>  ----------------------------<  103<H>  4.0   |  28  |  1.20    Ca    9.1      15 Feb 2018 05:55  Phos  2.9     02-15  Mg     2.1     02-15      PT/INR - ( 14 Feb 2018 20:47 )   PT: 12.3 sec;   INR: 1.11          PTT - ( 14 Feb 2018 20:47 )  PTT:28.2 sec      MICROBIOLOGY:            RECENT CULTURES:      RADIOLOGY & ADDITIONAL STUDIES: INTERVAL HPI/OVERNIGHT EVENTS:     OVERNIGHT: No overnight events. Pt remains afebrile. Ptt has received 2 IVIG treatments so far, Plts improving (35K today)    SUBJECTIVE: Patient seen and examined at bedside. Pt feels well, has no complaints. Pt reports back pain is minimal, does not bother her.    REVIEW OF SYSTEMS:    CONSTITUTIONAL: No weakness, fevers or chills  EYES/ENT: No visual changes;  No vertigo or throat pain   NECK: No pain or stiffness  RESPIRATORY: No cough, wheezing, hemoptysis; No shortness of breath  CARDIOVASCULAR: No chest pain or palpitations  GASTROINTESTINAL: No abdominal or epigastric pain. No nausea, vomiting, or hematemesis; No diarrhea or constipation. No melena or hematochezia.  GENITOURINARY: No dysuria, frequency or hematuria  NEUROLOGICAL: No numbness or weakness  SKIN: No itching, burning, rashes, or lesions   MSK: no joint pain, no joint swelling  All other review of systems is negative unless indicated above.      Allergies    Levaquin (Other (Severe))  vancomycin (Other (Severe))    Intolerances        ANTIBIOTICS/RELEVANT:  antimicrobials  cefTRIAXone Injectable. 2000 milliGRAM(s) IV Push every 24 hours    immunologic:    OTHER:  DULoxetine 60 milliGRAM(s) Oral daily  enalapril 10 milliGRAM(s) Oral daily  lactulose Syrup 10 Gram(s) Oral daily  levothyroxine 100 MICROGram(s) Oral daily      Objective:  Vital Signs Last 24 Hrs  T(C): 36.6 (15 Feb 2018 09:10), Max: 36.7 (14 Feb 2018 22:00)  T(F): 97.9 (15 Feb 2018 09:10), Max: 98.1 (14 Feb 2018 22:00)  HR: 90 (15 Feb 2018 11:07) (82 - 97)  BP: 158/71 (15 Feb 2018 11:07) (127/64 - 167/81)  BP(mean): 109 (15 Feb 2018 10:02) (92 - 116)  RR: 18 (15 Feb 2018 11:07) (16 - 23)  SpO2: 97% (15 Feb 2018 11:07) (95% - 98%)      PHYSICAL EXAM:    Constitutional: WDWN, NAD, comfortable in bed  HEENT: NC/AT, PERRLA, EOMI, no conjunctival pallor or scleral icterus, MMM, petechia in oropharynx have resolved.  Neck: Supple, no JVD.  Respiratory: CTA b/l, no W/R/R  Cardiovascular: RRR, normal S1 and S2, no M/R/G  Gastrointestinal: +BS, soft NTND, no guarding or rebound tenderness, no palpable masses   Back: thoracic spine (approximately t10-12) mildly tender to palpation. No step-offs  Extremities: WWP, no cyanosis, clubbing or edema.   Vascular: 2+ radial bl, DP/PT 2+ b/l  Neurological: AAOx3, no focal deficits, strength and sensation intact throughout.   Skin:  petechiae and ecchymoses on UE, LE and back and abdomen have significantly improved           LABS:                        9.0    7.2   )-----------( 35       ( 15 Feb 2018 05:55 )             26.9     02-15    141  |  101  |  40<H>  ----------------------------<  103<H>  4.0   |  28  |  1.20    Ca    9.1      15 Feb 2018 05:55  Phos  2.9     02-15  Mg     2.1     02-15      PT/INR - ( 14 Feb 2018 20:47 )   PT: 12.3 sec;   INR: 1.11          PTT - ( 14 Feb 2018 20:47 )  PTT:28.2 sec      MICROBIOLOGY:            RECENT CULTURES:      RADIOLOGY & ADDITIONAL STUDIES:

## 2018-02-15 NOTE — PROGRESS NOTE ADULT - PROBLEM SELECTOR PLAN 2
Per patient, biopsy was negative at previous hospitalization, however found to be bacteremic. Per [facility], patient bacteremic with pan-sensitive strep viridans but discharged on Levaquin and vancomycin 2/2 esr decrease on these abx  -[facility] record in chart  -per ID, start ceftriaxone 2g IV Q24, Day 3  -trending ESR/CRP Per patient, biopsy was negative at previous hospitalization, however found to be bacteremic. Per [facility], patient bacteremic with pan-sensitive strep viridans but discharged on Levaquin and vancomycin 2/2 esr decrease on these abx  -[facility] record in chart  -per ID, start ceftriaxone 2g IV Q24, Day 3  -trending ESR/CRP  -patient wishes to follow up with Dr. Iglesias for treatment of OM in outpatient

## 2018-02-15 NOTE — PROGRESS NOTE ADULT - SUBJECTIVE AND OBJECTIVE BOX
Interval Hx:  Plt count went from 10K to 35K this AM without platelet transfusions. No epistaxis or bleeding overnight. Pt feeling less lethargic.     Initial HPI:  ESTEPHANIE FLORES is a 67y Female with history of HTN, HLD and hypothyroidism who presents with 5 day history of gum bleeding and sites of injection. She was seen at [facility] ED on 1/24 for back pain. At that time, her hb and platelet count were normal. She had an elevated WBC count. Renal function WNL. She was discharged on analgesics however back pain worsened and she presented to [facility] few days later. She was subsequently found to have a T11-T12 osteomyelitis with abscess collection. She was started on IV Vancomycin and Levaquin. She was discharged on 2/5 with PICC line and to continue IV abx as outpatient. Of note, she was on a SQ blood thinner likely heparin or lovenox during that admission. No thrombocytopenia on discharge. She says she has never had thrombocytopenia in the past.     Gum bleeding began on 2/8. She initially thought gum bleeding was due to brushing her teeth but it continued to persist throughout weekend. She also noted bruising in sites of blood draws as well as where she SQ heparin during prior admission. She called her PCP Dr. Moise Hayden who had coags drawn on 2/10. Coags showed slightly prolonged INR and he recommended to come into ER to have IV Vitamin K x 1 dose. She denies hemarthroses. No BRBPR or melena. Per ED team, she had negative guiac. No hematuria.     No fevers/chills or night sweats. She notes weight loss due to reduced appetite for 1 week but no unintentional weight loss over the past few months. No leg pain or swelling.    In the ED, she had 1 episode of epistaxis.         Medications:  MEDICATIONS  (STANDING):  cefTRIAXone Injectable. 2000 milliGRAM(s) IV Push every 24 hours  dexamethasone  IVPB 40 milliGRAM(s) IV Intermittent daily  DULoxetine 60 milliGRAM(s) Oral daily  lactulose Syrup 10 Gram(s) Oral daily  levothyroxine 100 MICROGram(s) Oral daily    MEDICATIONS  (PRN):      PHYSICAL EXAM:    T(C): 36.4 (02-13-18 @ 04:52), Max: 36.8 (02-13-18 @ 02:00)  T(F): 97.6 (02-13-18 @ 04:52), Max: 98.3 (02-13-18 @ 02:00)  HR: 90 (02-13-18 @ 08:26) (82 - 111)  BP: 115/58 (02-13-18 @ 08:26) (96/58 - 118/55)  ABP: --  ABP(mean): --  RR: 19 (02-13-18 @ 04:00) (17 - 19)  SpO2: 93% (02-13-18 @ 08:26) (93% - 98%)      Gen: well developed, well nourished.   HEENT: NCAT, No conjunctival pallor, no scleral icterus. No petechiae or evidence of gum bleeding   Neck: supple, no JVD  Lymph nodes: No cervical, axillary or inguinal adenopathy  Cardiovascular: RR, nl S1S2, no murmurs/rubs/gallops  Respiratory: clear air entry b/l  Gastrointestinal: BS+, soft, NT/ND, no masses, no splenomegaly, no hepatomegaly, no evidence for ascites  Extremities: no clubbing/cyanosis, no edema, no calf tenderness  Vascular:  DP/PT 2+ b/l  Neurological: CN 2-12 grossly intact, no focal deficits; 5/5 strength in upper and lower extremities   Skin: no rash however ecchymoses noted bilaterally in upper extremities   BacK: no petechiae noted in lower back however ecchymoses noted on buttocks        Labs:                                     9.0    7.2   )-----------( 35       ( 15 Feb 2018 05:55 )             26.9         CBC Full  -  ( 15 Feb 2018 05:55 )  WBC Count : 7.2 K/uL  Hemoglobin : 9.0 g/dL  Hematocrit : 26.9 %  Platelet Count - Automated : 35 K/uL  Mean Cell Volume : 89.7 fL  Mean Cell Hemoglobin : 30.0 pg  Mean Cell Hemoglobin Concentration : 33.5 g/dL  Auto Neutrophil # : x  Auto Lymphocyte # : x  Auto Monocyte # : x  Auto Eosinophil # : x  Auto Basophil # : x  Auto Neutrophil % : x  Auto Lymphocyte % : x  Auto Monocyte % : x  Auto Eosinophil % : x  Auto Basophil % : x        02-15    141  |  101  |  40<H>  ----------------------------<  103<H>  4.0   |  28  |  1.20    Ca    9.1      15 Feb 2018 05:55  Phos  2.9     02-15  Mg     2.1     02-15          PT/INR - ( 14 Feb 2018 20:47 )   PT: 12.3 sec;   INR: 1.11          PTT - ( 14 Feb 2018 20:47 )  PTT:28.2 sec

## 2018-02-15 NOTE — PROGRESS NOTE ADULT - ASSESSMENT
68 yo F with HTN, Hypothyroidism, Asthma with recent hospitalization for Strep Viridans bacteremia and suspected T11-12 OM/Discitis presents from home with bleeding gums and epistaxis, was found to have platelets 2K and Cr 2.03, admitted for further workup, Cr now improving.

## 2018-02-15 NOTE — PROGRESS NOTE ADULT - ATTENDING COMMENTS
Pt feels good and platelet count noted. no bleeding. Heme plans noted and pt to continue care on regional floor.

## 2018-02-15 NOTE — PROGRESS NOTE ADULT - ASSESSMENT
67 year old F who presents with 5 day history of gum bleeding and bruising in upper extremities and on further workup is found to have severe thrombocytopenia with platelet count of 2K. No CNS bleed or active bleeding episodes however pt having gum bleeding and epistaxis. Differential diagnosis likely drug-induced ITP secondary to Vancomycin however will r/o HIT and TTP.    Peripheral smear reviewed:   RBC's: numerous micro-spherocytes, no shistocytes.   Platelets: NO platelets noted on peripheral smear; no evidence of clumping  WBC's: Pseudo-Pelger-Huet cells noted, no blasts. 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 Plt count improving after discontinuation of Vancomycin and IV Decadron + IVIG x 2 days.     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 Will f/u ALYCIA.   -Do not recommend transfusion of platelets unless having active, symptomatic bleeding     #Anemia  Normocytic and new-onset. Anemia improved after transfusion of 1 unit  -CTM CBC  -Recommend iron studies, B12 and folate evaluation  -Transfuse prBC to keep threshold Hb > 7 g/dL      Will discuss case with Dr. Dunlap

## 2018-02-15 NOTE — PROGRESS NOTE ADULT - ASSESSMENT
66 yo F with PMHx of hypothyroid, HTN, asthma sent to ED by PCP for spontaneous gum bleeding, epistaxis, found to have WIN and new thrombocytopenia (Plts 2K), suspicious for drug-induced thrombocytopenia/ ITP in setting of new abx (vanc/ levaquin) for presumed viridans strep T11-12 OM. Vancomycin and Levaquin have been stopped, and added as allergies. Plts currently improving with IVIG treatment    Recommend:  - C/w Ceftriaxone 2g IV q24hrs for treatment of vertebral osteomyelitis through March 9th  - Pt will need weekly CBC, CMP, ESR, CRP.   - Please schedule follow up with Dr. Todd on 2/26 or 3/1.  - Will sign off, reconsult as needed

## 2018-02-15 NOTE — PROGRESS NOTE ADULT - PROBLEM SELECTOR PLAN 2
- suspected secondary to ITP from vancomycin   - platelets improved initially after s/p 3packs of platelets, 40mg IV decadron, and IVIG x 2 --> improved to 35K today --> receiving additional IVIG today  - f/u heme recs

## 2018-02-15 NOTE — PROGRESS NOTE ADULT - ATTENDING COMMENTS
Thrombocytopenia significantly improved. Cr wnl and vancomycin clearing out of system. Would add vancomycin and levofloxacin to list of patient's intolerances. Continue ceftriaxone at least through 3/9. Weekly labs as above. Please arrange to have patient see me on 2/26 or 3/1.  Please reconsult with ?  To see again as requested

## 2018-02-15 NOTE — PROGRESS NOTE ADULT - SUBJECTIVE AND OBJECTIVE BOX
Patient is a 67y Female seen and evaluated at bedside. Patient reports feeling much better today. No further episodes of bleeding. No acute complaints. Denies fever, chills, n/v, headache, chest pain, dyspnea, cough, abdominal pain, dysuria. Has been tolerating PO and urinating well. Back pain improved. Cr improved to 1.2 today. Plan for IVIG today.       cefTRIAXone Injectable. 2000 every 24 hours  DULoxetine 60 daily  enalapril 10 daily  lactulose Syrup 10 daily  levothyroxine 100 daily      Allergies    No Known Allergies    Intolerances        T(C): , Max: 36.7 (02-14-18 @ 22:00)  T(F): , Max: 98.1 (02-14-18 @ 22:00)  HR: 97 (02-15-18 @ 10:02)  BP: 166/76 (02-15-18 @ 10:02)  BP(mean): 116 (02-15-18 @ 09:08)  RR: 20 (02-15-18 @ 09:08)  SpO2: 96% (02-15-18 @ 09:08)  Wt(kg): --    02-14 @ 07:01  -  02-15 @ 07:00  --------------------------------------------------------  IN: 50 mL / OUT: 800 mL / NET: -750 mL      PHYSICAL EXAM:  Constitutional: WDWN resting comfortably in bed; NAD  Eyes: PERRL, EOMI, clear conjunctiva  ENT: no nasal discharge; uvula midline, MMM; petechia on hard palate much improved  Neck: supple; no JVD or thyromegaly  Respiratory: CTA B/L; no W/R/R, no retractions  Cardiac: +S1/S2; RRR; no M/R/G; PMI non-displaced  Gastrointestinal: soft, NT/ND; no rebound or guarding; +BSx4  Genitourinary: no CVA tenderness   Extremities: WWP, no clubbing or cyanosis; no peripheral edema  Musculoskeletal: NROM x4; no joint swelling, tenderness or erythema  Vascular: 2+ radial, femoral, DP/PT pulses B/L  Dermatologic: scattered nonblanching petechia throughout UE and LE, improving  Lymphatic: no submandibular or cervical LAD  Neurologic: AAOx3; CNII-XII grossly intact; no focal deficits          ACCESS:     LABS:                        9.0    7.2   )-----------( 35       ( 15 Feb 2018 05:55 )             26.9     02-15    141  |  101  |  40<H>  ----------------------------<  103<H>  4.0   |  28  |  1.20    Ca    9.1      15 Feb 2018 05:55  Phos  2.9     02-15  Mg     2.1     02-15        PT/INR - ( 14 Feb 2018 20:47 )   PT: 12.3 sec;   INR: 1.11          PTT - ( 14 Feb 2018 20:47 )  PTT:28.2 sec

## 2018-02-15 NOTE — PROGRESS NOTE ADULT - PROBLEM SELECTOR PLAN 1
- patient presented with Cr 2.03 (baseline 0.69 on 1/24/18) - differential includes pre-renal secondary to dehydration/vomiting vs intrinsic secondary to drug toxicity 2/2 elevated vancomycin level; less likely post-renal/obstructive as making good urine  - Cr improving to 1.20 today   - FENa 0.4% and BUN:Cr 50:1 more consistent with pre-renal process; no eosinophils on differential, less likely AIN   - please obtain U/A to evaluate for any evidence of protein, WBC, RBCs, or casts  - encourage PO hydration   - avoid nephrotoxic meds  - monitor I/Os and serial BMPs

## 2018-02-15 NOTE — PROGRESS NOTE ADULT - PROBLEM SELECTOR PLAN 1
Presenting with platelet count of 2k with bleeding gums and diffuse petechial rash. 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  -Heme consulted by ED, f/u recs  -mildly increased fibrinogen, TSH mildly decreased  -f/u [id number], serotonin assay, PF4  -DC decadron 40mg, patient finished 4 day course  -s/p IVIG 35mg over 6 hours x2 for renal impairment, will likely receive additional dose today  -will hold off on further heparin and SCDs  -fall precautions Presenting with platelet count of 2k with bleeding gums and diffuse petechial rash. 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  -Heme consulted by ED, f/u recs  -mildly increased fibrinogen, TSH mildly decreased  -PF4 resulted positive, f/u [id number], serotonin assay  -drug induced thrombocytopenia lab sent  -DC decadron 40mg, patient finished 4 day course  -s/p IVIG 35mg over 6 hours x2 for renal impairment, f/u heme regarding receiving additional dose today  -will hold off on further heparin and SCDs  -fall precautions

## 2018-02-15 NOTE — PROGRESS NOTE ADULT - PROBLEM SELECTOR PLAN 1
Presenting with platelet count of 2k with bleeding gums and diffuse petechial rash. 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  -Heme consulted by ED, f/u recs  -mildly increased fibrinogen, TSH mildly decreased  -PF4 resulted positive, f/u [id number], serotonin assay, vanc associated antibody  -drug induced thrombocytopenia lab sent  -DC decadron 40mg, patient finished 4 day course  -s/p IVIG 35mg over 6 hours x2 for renal impairment, f/u heme regarding receiving additional dose   - WIll repeat CBC to check platelet count   -will hold off on further heparin and SCDs  -fall precautions

## 2018-02-15 NOTE — PROGRESS NOTE ADULT - SUBJECTIVE AND OBJECTIVE BOX
HOSPITAL COURSE    Patient is a 68 yo female with PMHx of hypothyroid, HTN, asthma who was sent to the ED for thrombocytopenia and bleeding. Patient was recently discharged from [facility] for spinal OM on imaging but not on biopsy (biopsy after abx) with PICC line for IV vancomycin/Levaquin, heparin flushes. Per [facility]/Chip, the patient was being treated for pansensitive strep viridans.  She presented to today for gum bleeding that started on Wednesday, spontaneous, multiple episodes, and with epistaxis possibly for the past three days but noticed today. As per outpatient records: EDER WNL no vegetation, normal LV and RV size and function. Atherosclerotic disease of the aorta. MRI evidence of T11-T12 discitis/osteomyelitis with epidural phlegmon and small abscess, w/out significant cord compression. Possible ileus on 1/26. In the ED, she was afebrile, , BP 96/58, sat 97% on RA. CT head was negative for intracranial bleed. She was noticed to have platelet of 2, hematology was consulted and reviewed the smear, 1 unit of platelet ordered. She was given Xanax 0.5mg and started on a 4 day course of decadron 40mg daily. ICU was consulted and the patient was transferred to [facility] for further management. Per Heme, the thrombocytopenia was likely 2/2 Vancomycin induced immune thrombocytopenia, and her OP Abx were not continued. HIT labs were additionally sent. Following 2 Units of platelet transfusion, the patient continued to have a platelet count o f 2K. The patient was given IVIG 35mg, half dose 2/2 renal impairment, and was subsequently transfused an additional 1U Platelets with f/u CBC showing increase in platelets to 40. ID was consulted for continued treatment of OM and the patient was started on Ceftriaxone 2g daily, with plans to trend ESR and CRP to measure response. The following day platelets were decreased to 10k and she was given an additional treatment of IVIG 35mg and transfused 1U PRBC as Hgb was slowly downtrending and she is at increased risk for hemorrhage. A post transfusion CBC showed good response to PRBC and uptrending platelets that continued to the next morning. The patient showed no further signs of active bleeding in epistaxis or otherwise and was cleared for stepdown to [facility] for further management.     OVERNIGHT EVENTS:    SUBJECTIVE / INTERVAL HPI: Patient seen and examined at bedside.     VITAL SIGNS:  Vital Signs Last 24 Hrs  T(C): 36.9 (15 Feb 2018 16:02), Max: 36.9 (15 Feb 2018 16:02)  T(F): 98.4 (15 Feb 2018 16:02), Max: 98.4 (15 Feb 2018 16:02)  HR: 72 (15 Feb 2018 16:02) (72 - 97)  BP: 167/79 (15 Feb 2018 16:02) (144/76 - 167/81)  BP(mean): 109 (15 Feb 2018 16:02) (98 - 116)  RR: 18 (15 Feb 2018 16:02) (16 - 23)  SpO2: 96% (15 Feb 2018 16:02) (95% - 98%)    PHYSICAL EXAM:    Constitutional: NAD  HEENT:  petechia noted in oropharynx   Neck: Supple, no JVD  Respiratory: Bibasilar crackles. No wheezing auscultated. Good air entry   Cardiovascular: S1 S2, RRR no m/r/g.   Gastrointestinal: Paetechia noted on abdomen. +BS, soft NTND, no guarding or rebound tenderness, no palpable masses   Extremities: wwp; no cyanosis, clubbing or edema. Petechia noted on arms.   Vascular: Pulses equal and strong throughout.   Neurological: AAOx3, no CN deficits, strength and sensation intact throughout.   Skin: improved petechia on UE, LE, and trunk.     MEDICATIONS:  MEDICATIONS  (STANDING):  cefTRIAXone Injectable. 2000 milliGRAM(s) IV Push every 24 hours  DULoxetine 60 milliGRAM(s) Oral daily  enalapril 10 milliGRAM(s) Oral daily  lactulose Syrup 10 Gram(s) Oral daily  levothyroxine 100 MICROGram(s) Oral daily    MEDICATIONS  (PRN):      ALLERGIES:  Allergies    Levaquin (Other (Severe))  vancomycin (Other (Severe))    Intolerances        LABS:                        9.0    7.2   )-----------( 35       ( 15 Feb 2018 05:55 )             26.9     02-15    141  |  101  |  40<H>  ----------------------------<  103<H>  4.0   |  28  |  1.20    Ca    9.1      15 Feb 2018 05:55  Phos  2.9     02-15  Mg     2.1     02-15      PT/INR - ( 14 Feb 2018 20:47 )   PT: 12.3 sec;   INR: 1.11          PTT - ( 14 Feb 2018 20:47 )  PTT:28.2 sec    CAPILLARY BLOOD GLUCOSE          RADIOLOGY & ADDITIONAL TESTS: Reviewed.    ASSESSMENT:    PLAN: HOSPITAL COURSE    Patient is a 68 yo female with PMHx of hypothyroid, HTN, asthma who was sent to the ED for thrombocytopenia and bleeding. Patient was recently discharged from [facility] for spinal OM on imaging but not on biopsy (biopsy after abx) with PICC line for IV vancomycin/Levaquin, heparin flushes. Per [facility]/Chip, the patient was being treated for pansensitive strep viridans.  She presented to today for gum bleeding that started on Wednesday, spontaneous, multiple episodes, and with epistaxis possibly for the past three days but noticed today. As per outpatient records: EDER WNL no vegetation, normal LV and RV size and function. Atherosclerotic disease of the aorta. MRI evidence of T11-T12 discitis/osteomyelitis with epidural phlegmon and small abscess, w/out significant cord compression. Possible ileus on 1/26. In the ED, she was afebrile, , BP 96/58, sat 97% on RA. CT head was negative for intracranial bleed. She was noticed to have platelet of 2, hematology was consulted and reviewed the smear, 1 unit of platelet ordered. She was given Xanax 0.5mg and started on a 4 day course of decadron 40mg daily. ICU was consulted and the patient was transferred to [facility] for further management. Per Heme, the thrombocytopenia was likely 2/2 Vancomycin induced immune thrombocytopenia, and her OP Abx were not continued. HIT labs were additionally sent. Following 2 Units of platelet transfusion, the patient continued to have a platelet count o f 2K. The patient was given IVIG 35mg, half dose 2/2 renal impairment, and was subsequently transfused an additional 1U Platelets with f/u CBC showing increase in platelets to 40. ID was consulted for continued treatment of OM and the patient was started on Ceftriaxone 2g daily, with plans to trend ESR and CRP to measure response. The following day platelets were decreased to 10k and she was given an additional treatment of IVIG 35mg and transfused 1U PRBC as Hgb was slowly downtrending and she is at increased risk for hemorrhage. A post transfusion CBC showed good response to PRBC and uptrending platelets that continued to the next morning. The patient showed no further signs of active bleeding in epistaxis or otherwise and was cleared for stepdown to [facility] for further management.     OVERNIGHT EVENTS:    SUBJECTIVE / INTERVAL HPI: Patient seen and examined at bedside.     VITAL SIGNS:  Vital Signs Last 24 Hrs  T(C): 36.9 (15 Feb 2018 16:02), Max: 36.9 (15 Feb 2018 16:02)  T(F): 98.4 (15 Feb 2018 16:02), Max: 98.4 (15 Feb 2018 16:02)  HR: 72 (15 Feb 2018 16:02) (72 - 97)  BP: 167/79 (15 Feb 2018 16:02) (144/76 - 167/81)  BP(mean): 109 (15 Feb 2018 16:02) (98 - 116)  RR: 18 (15 Feb 2018 16:02) (16 - 23)  SpO2: 96% (15 Feb 2018 16:02) (95% - 98%)    PHYSICAL EXAM:    Constitutional: NAD  HEENT:  petechia noted in oropharynx   Neck: Supple, no JVD  Respiratory: Bibasilar crackles. No wheezing auscultated. Good air entry   Cardiovascular: S1 S2, RRR no m/r/g.   Gastrointestinal: Paetechia noted on abdomen. +BS, soft NTND, no guarding or rebound tenderness, no palpable masses   Extremities: wwp; no cyanosis, clubbing or edema. Petechia noted on arms.   Vascular: 2+ pulses   Neurological: AAOx3, no CN deficits, strength and sensation intact throughout.     MEDICATIONS:  MEDICATIONS  (STANDING):  cefTRIAXone Injectable. 2000 milliGRAM(s) IV Push every 24 hours  DULoxetine 60 milliGRAM(s) Oral daily  enalapril 10 milliGRAM(s) Oral daily  lactulose Syrup 10 Gram(s) Oral daily  levothyroxine 100 MICROGram(s) Oral daily    MEDICATIONS  (PRN):      ALLERGIES:  Allergies    Levaquin (Other (Severe))  vancomycin (Other (Severe))    Intolerances        LABS:                        9.0    7.2   )-----------( 35       ( 15 Feb 2018 05:55 )             26.9     02-15    141  |  101  |  40<H>  ----------------------------<  103<H>  4.0   |  28  |  1.20    Ca    9.1      15 Feb 2018 05:55  Phos  2.9     02-15  Mg     2.1     02-15      PT/INR - ( 14 Feb 2018 20:47 )   PT: 12.3 sec;   INR: 1.11          PTT - ( 14 Feb 2018 20:47 )  PTT:28.2 sec    CAPILLARY BLOOD GLUCOSE          RADIOLOGY & ADDITIONAL TESTS: Reviewed.    ASSESSMENT:    PLAN: TRANSFER ACCEPTANCE NOTE    Patient is a 68 yo female with PMHx of hypothyroid, HTN, asthma who was sent to the ED for thrombocytopenia and bleeding. Patient was recently discharged from [facility] for spinal OM on imaging but not on biopsy (biopsy after abx) with PICC line for IV vancomycin/Levaquin, heparin flushes. Per [facility]/Chip, the patient was being treated for pansensitive strep viridans.  She presented to today for gum bleeding that started on Wednesday, spontaneous, multiple episodes, and with epistaxis possibly for the past three days but noticed today. As per outpatient records: EDER WNL no vegetation, normal LV and RV size and function. Atherosclerotic disease of the aorta. MRI evidence of T11-T12 discitis/osteomyelitis with epidural phlegmon and small abscess, w/out significant cord compression. Possible ileus on 1/26. In the ED, she was afebrile, , BP 96/58, sat 97% on RA. CT head was negative for intracranial bleed. She was noticed to have platelet of 2, hematology was consulted and reviewed the smear, 1 unit of platelet ordered. She was given Xanax 0.5mg and started on a 4 day course of decadron 40mg daily. ICU was consulted [facility]. Per Heme, the thrombocytopenia was likely 2/2 Vancomycin induced immune thrombocytopenia, and her OP Abx were not continued. HIT labs were additionally sent. Following 2 Units of platelet transfusion, the patient continued to have a platelet count o f 2K. The patient was given IVIG 35mg, half dose 2/2 renal impairment, and was subsequently transfused an additional 1U Platelets with f/u CBC showing increase in platelets to 40. ID was consulted for continued treatment of OM and the patient was started on Ceftriaxone 2g daily, with plans to trend ESR and CRP to measure response. The following day platelets were decreased to 10k and she was given an additional treatment of IVIG 35mg and transfused 1U PRBC as Hgb was slowly downtrending and she is at increased risk for hemorrhage. A post transfusion CBC showed good response to PRBC and uptrending platelets that continued to the next morning. The patient showed no further signs of active bleeding in epistaxis or otherwise and was cleared for stepdown to [facility] for further management.     OVERNIGHT EVENTS:    SUBJECTIVE / INTERVAL HPI: Patient seen and examined at bedside.     VITAL SIGNS:  Vital Signs Last 24 Hrs  T(C): 36.9 (15 Feb 2018 16:02), Max: 36.9 (15 Feb 2018 16:02)  T(F): 98.4 (15 Feb 2018 16:02), Max: 98.4 (15 Feb 2018 16:02)  HR: 72 (15 Feb 2018 16:02) (72 - 97)  BP: 167/79 (15 Feb 2018 16:02) (144/76 - 167/81)  BP(mean): 109 (15 Feb 2018 16:02) (98 - 116)  RR: 18 (15 Feb 2018 16:02) (16 - 23)  SpO2: 96% (15 Feb 2018 16:02) (95% - 98%)    PHYSICAL EXAM:    Constitutional: NAD  HEENT:  petechia noted in oropharynx   Neck: Supple, no JVD  Respiratory: Bibasilar crackles. No wheezing auscultated. Good air entry   Cardiovascular: S1 S2, RRR no m/r/g.   Gastrointestinal: Paetechia noted on abdomen. +BS, soft NTND, no guarding or rebound tenderness, no palpable masses   Extremities: wwp; no cyanosis, clubbing or edema. Petechia noted on arms.   Vascular: 2+ pulses   Neurological: AAOx3, no CN deficits, strength and sensation intact throughout.     MEDICATIONS:  MEDICATIONS  (STANDING):  cefTRIAXone Injectable. 2000 milliGRAM(s) IV Push every 24 hours  DULoxetine 60 milliGRAM(s) Oral daily  enalapril 10 milliGRAM(s) Oral daily  lactulose Syrup 10 Gram(s) Oral daily  levothyroxine 100 MICROGram(s) Oral daily    MEDICATIONS  (PRN):      ALLERGIES:  Allergies    Levaquin (Other (Severe))  vancomycin (Other (Severe))    Intolerances        LABS:                        9.0    7.2   )-----------( 35       ( 15 Feb 2018 05:55 )             26.9     02-15    141  |  101  |  40<H>  ----------------------------<  103<H>  4.0   |  28  |  1.20    Ca    9.1      15 Feb 2018 05:55  Phos  2.9     02-15  Mg     2.1     02-15      PT/INR - ( 14 Feb 2018 20:47 )   PT: 12.3 sec;   INR: 1.11          PTT - ( 14 Feb 2018 20:47 )  PTT:28.2 sec    CAPILLARY BLOOD GLUCOSE          RADIOLOGY & ADDITIONAL TESTS: Reviewed.    ASSESSMENT:    PLAN:

## 2018-02-15 NOTE — PROGRESS NOTE ADULT - ATTENDING COMMENTS
dx  ITP - rec'd steroids + ivig. plts 35k. no bleeding. heme recs  t11 vertebral osteomyelitis - ceftriaxone  htn - enalipril  ATN - improved. monitor creat/ strict I/Os  asthma - stable. bronchodilators prn

## 2018-02-15 NOTE — PROGRESS NOTE ADULT - ATTENDING COMMENTS
Patient examined, resident's hx and PE reviewed and confirmed. I find WIN improved. HTN uncontrolled. A/P reviewed and confirmed. Follow SCr. Can restart ACE-I. See full note.

## 2018-02-16 ENCOUNTER — TRANSCRIPTION ENCOUNTER (OUTPATIENT)
Age: 68
End: 2018-02-16

## 2018-02-16 VITALS
RESPIRATION RATE: 18 BRPM | DIASTOLIC BLOOD PRESSURE: 81 MMHG | SYSTOLIC BLOOD PRESSURE: 144 MMHG | OXYGEN SATURATION: 95 % | TEMPERATURE: 99 F | HEART RATE: 95 BPM

## 2018-02-16 LAB
ANION GAP SERPL CALC-SCNC: 15 MMOL/L — SIGNIFICANT CHANGE UP (ref 5–17)
BLD GP AB SCN SERPL QL: NEGATIVE — SIGNIFICANT CHANGE UP
BUN SERPL-MCNC: 31 MG/DL — HIGH (ref 7–23)
CALCIUM SERPL-MCNC: 9 MG/DL — SIGNIFICANT CHANGE UP (ref 8.4–10.5)
CHLORIDE SERPL-SCNC: 103 MMOL/L — SIGNIFICANT CHANGE UP (ref 96–108)
CO2 SERPL-SCNC: 24 MMOL/L — SIGNIFICANT CHANGE UP (ref 22–31)
CREAT SERPL-MCNC: 1.12 MG/DL — SIGNIFICANT CHANGE UP (ref 0.5–1.3)
CRP SERPL-MCNC: 0.47 MG/DL — HIGH (ref 0–0.4)
ERYTHROCYTE [SEDIMENTATION RATE] IN BLOOD: 80 MM/HR — HIGH
GLUCOSE SERPL-MCNC: 102 MG/DL — HIGH (ref 70–99)
HCT VFR BLD CALC: 27.2 % — LOW (ref 34.5–45)
HGB BLD-MCNC: 9.5 G/DL — LOW (ref 11.5–15.5)
MAGNESIUM SERPL-MCNC: 2 MG/DL — SIGNIFICANT CHANGE UP (ref 1.6–2.6)
MCHC RBC-ENTMCNC: 29.9 PG — SIGNIFICANT CHANGE UP (ref 27–34)
MCHC RBC-ENTMCNC: 34.9 G/DL — SIGNIFICANT CHANGE UP (ref 32–36)
MCV RBC AUTO: 85.5 FL — SIGNIFICANT CHANGE UP (ref 80–100)
PLATELET # BLD AUTO: 85 K/UL — LOW (ref 150–400)
POTASSIUM SERPL-MCNC: 3.2 MMOL/L — LOW (ref 3.5–5.3)
POTASSIUM SERPL-SCNC: 3.2 MMOL/L — LOW (ref 3.5–5.3)
RBC # BLD: 3.18 M/UL — LOW (ref 3.8–5.2)
RBC # FLD: 13.1 % — SIGNIFICANT CHANGE UP (ref 10.3–16.9)
RH IG SCN BLD-IMP: POSITIVE — SIGNIFICANT CHANGE UP
SODIUM SERPL-SCNC: 142 MMOL/L — SIGNIFICANT CHANGE UP (ref 135–145)
WBC # BLD: 8.6 K/UL — SIGNIFICANT CHANGE UP (ref 3.8–10.5)
WBC # FLD AUTO: 8.6 K/UL — SIGNIFICANT CHANGE UP (ref 3.8–10.5)

## 2018-02-16 PROCEDURE — 96374 THER/PROPH/DIAG INJ IV PUSH: CPT

## 2018-02-16 PROCEDURE — 86923 COMPATIBILITY TEST ELECTRIC: CPT

## 2018-02-16 PROCEDURE — 99285 EMERGENCY DEPT VISIT HI MDM: CPT | Mod: 25

## 2018-02-16 PROCEDURE — 85730 THROMBOPLASTIN TIME PARTIAL: CPT

## 2018-02-16 PROCEDURE — 84100 ASSAY OF PHOSPHORUS: CPT

## 2018-02-16 PROCEDURE — 99239 HOSP IP/OBS DSCHRG MGMT >30: CPT

## 2018-02-16 PROCEDURE — 83615 LACTATE (LD) (LDH) ENZYME: CPT

## 2018-02-16 PROCEDURE — 84300 ASSAY OF URINE SODIUM: CPT

## 2018-02-16 PROCEDURE — 82570 ASSAY OF URINE CREATININE: CPT

## 2018-02-16 PROCEDURE — 85397 CLOTTING FUNCT ACTIVITY: CPT

## 2018-02-16 PROCEDURE — 85384 FIBRINOGEN ACTIVITY: CPT

## 2018-02-16 PROCEDURE — 82746 ASSAY OF FOLIC ACID SERUM: CPT

## 2018-02-16 PROCEDURE — 86901 BLOOD TYPING SEROLOGIC RH(D): CPT

## 2018-02-16 PROCEDURE — 83010 ASSAY OF HAPTOGLOBIN QUANT: CPT

## 2018-02-16 PROCEDURE — 83735 ASSAY OF MAGNESIUM: CPT

## 2018-02-16 PROCEDURE — P9016: CPT

## 2018-02-16 PROCEDURE — 80061 LIPID PANEL: CPT

## 2018-02-16 PROCEDURE — 80202 ASSAY OF VANCOMYCIN: CPT

## 2018-02-16 PROCEDURE — 36430 TRANSFUSION BLD/BLD COMPNT: CPT

## 2018-02-16 PROCEDURE — 86880 COOMBS TEST DIRECT: CPT

## 2018-02-16 PROCEDURE — 82728 ASSAY OF FERRITIN: CPT

## 2018-02-16 PROCEDURE — 70450 CT HEAD/BRAIN W/O DYE: CPT

## 2018-02-16 PROCEDURE — 84443 ASSAY THYROID STIM HORMONE: CPT

## 2018-02-16 PROCEDURE — 82607 VITAMIN B-12: CPT

## 2018-02-16 PROCEDURE — 85652 RBC SED RATE AUTOMATED: CPT

## 2018-02-16 PROCEDURE — 85610 PROTHROMBIN TIME: CPT

## 2018-02-16 PROCEDURE — 83550 IRON BINDING TEST: CPT

## 2018-02-16 PROCEDURE — 74018 RADEX ABDOMEN 1 VIEW: CPT

## 2018-02-16 PROCEDURE — P9035: CPT

## 2018-02-16 PROCEDURE — 85045 AUTOMATED RETICULOCYTE COUNT: CPT

## 2018-02-16 PROCEDURE — 85027 COMPLETE CBC AUTOMATED: CPT

## 2018-02-16 PROCEDURE — 85025 COMPLETE CBC W/AUTO DIFF WBC: CPT

## 2018-02-16 PROCEDURE — 86850 RBC ANTIBODY SCREEN: CPT

## 2018-02-16 PROCEDURE — 86140 C-REACTIVE PROTEIN: CPT

## 2018-02-16 PROCEDURE — 82550 ASSAY OF CK (CPK): CPT

## 2018-02-16 PROCEDURE — 36415 COLL VENOUS BLD VENIPUNCTURE: CPT

## 2018-02-16 PROCEDURE — 80048 BASIC METABOLIC PNL TOTAL CA: CPT

## 2018-02-16 PROCEDURE — 86022 PLATELET ANTIBODIES: CPT

## 2018-02-16 PROCEDURE — 86900 BLOOD TYPING SEROLOGIC ABO: CPT

## 2018-02-16 PROCEDURE — 80053 COMPREHEN METABOLIC PANEL: CPT

## 2018-02-16 RX ORDER — AMLODIPINE BESYLATE 2.5 MG/1
10 TABLET ORAL ONCE
Qty: 0 | Refills: 0 | Status: COMPLETED | OUTPATIENT
Start: 2018-02-16 | End: 2018-02-16

## 2018-02-16 RX ORDER — ATORVASTATIN CALCIUM 80 MG/1
10 TABLET, FILM COATED ORAL
Qty: 0 | Refills: 0 | COMMUNITY

## 2018-02-16 RX ORDER — HYDROCHLOROTHIAZIDE 25 MG
50 TABLET ORAL
Qty: 0 | Refills: 0 | COMMUNITY

## 2018-02-16 RX ORDER — LACTULOSE 10 G/15ML
15 SOLUTION ORAL
Qty: 0 | Refills: 0 | COMMUNITY
Start: 2018-02-16

## 2018-02-16 RX ORDER — OXYCODONE HYDROCHLORIDE 5 MG/1
1 TABLET ORAL
Qty: 0 | Refills: 0 | COMMUNITY

## 2018-02-16 RX ORDER — CEFTRIAXONE 500 MG/1
2 INJECTION, POWDER, FOR SOLUTION INTRAMUSCULAR; INTRAVENOUS EVERY 24 HOURS
Qty: 0 | Refills: 0 | Status: DISCONTINUED | OUTPATIENT
Start: 2018-02-16 | End: 2018-02-16

## 2018-02-16 RX ORDER — CIPROFLOXACIN LACTATE 400MG/40ML
1 VIAL (ML) INTRAVENOUS
Qty: 0 | Refills: 0 | COMMUNITY
End: 2018-03-09

## 2018-02-16 RX ORDER — POTASSIUM CHLORIDE 20 MEQ
40 PACKET (EA) ORAL EVERY 4 HOURS
Qty: 0 | Refills: 0 | Status: COMPLETED | OUTPATIENT
Start: 2018-02-16 | End: 2018-02-16

## 2018-02-16 RX ADMIN — LACTULOSE 10 GRAM(S): 10 SOLUTION ORAL at 11:33

## 2018-02-16 RX ADMIN — Medication 100 MICROGRAM(S): at 07:32

## 2018-02-16 RX ADMIN — CEFTRIAXONE 100 GRAM(S): 500 INJECTION, POWDER, FOR SOLUTION INTRAMUSCULAR; INTRAVENOUS at 15:06

## 2018-02-16 RX ADMIN — Medication 40 MILLIEQUIVALENT(S): at 11:32

## 2018-02-16 RX ADMIN — AMLODIPINE BESYLATE 10 MILLIGRAM(S): 2.5 TABLET ORAL at 12:57

## 2018-02-16 RX ADMIN — Medication 40 MILLIEQUIVALENT(S): at 15:06

## 2018-02-16 RX ADMIN — Medication 10 MILLIGRAM(S): at 07:32

## 2018-02-16 RX ADMIN — DULOXETINE HYDROCHLORIDE 60 MILLIGRAM(S): 30 CAPSULE, DELAYED RELEASE ORAL at 11:32

## 2018-02-16 NOTE — PROGRESS NOTE ADULT - PROBLEM SELECTOR PLAN 2
- suspected secondary to ITP from vancomycin   - platelets improved initially after s/p 3packs of platelets, 40mg IV decadron, and IVIG x 2 --> improved to 85K today   - f/u heme recs

## 2018-02-16 NOTE — DISCHARGE NOTE ADULT - PLAN OF CARE
Outpatient followup You were admitted to this hospital with bleeding and were found to have low platelet count. This decrease in platelets was likely due to Vancomycin and Levaquin. Please discontinue these medications and instead taking Ceftriaxone as listed below.   - Please followup with Dr. Dunlap on Feb 22nd 11:30am- for lab draw AND Feb 23rd @12pm for appointment   - Please also discontinue your home Hydrochlorothiazide for high blood pressure.   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 This decrease in platelets was likely due to Vancomycin and Levaquin. Please discontinue these medications and instead taking Ceftriaxone as listed below.   - Please followup with Dr. Dunlap on Feb 22nd 11:30am- for lab draw AND Feb 23rd @12pm for appointment   - Please also discontinue your home Hydrochlorothiazide for high blood pressure.   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 Please discontinue both these medications and instead take Ceftriaxone 2grams IV every 24 hours till March 9th.   - Please arrange with your PCP to obtain weekly CBC, CMP, ESR, CRP while you are on antibiotics to track progress.   - Please followup with Dr. Todd from Infectious diseases on Date listed below -You were given 1 blood transfusion for low hemoglobin and you responded well. Please followup with your PCP for determine further need for blood transfusions -Please discontinue home Hydrochlorothiazide because it can potentially lower platelet count  - Instead, we recommend increasing your home Enalapril to 20mg daily   - Please followup with your PCP to make further changes Please continue your home synthroid You had acute kidney injury when your came to the hospital. This was likely due to Vancomycin as well as low food and water intake. This injury resolved during this admission.   - Please ensure adequate hydration after discharge   - Please check kidney function with your PCP after discharge

## 2018-02-16 NOTE — DISCHARGE NOTE ADULT - PROVIDER TOKENS
TOKEN:'[id number]',TOKEN:'[id number]:'[id number]',FREE:[LAST:[Dr. Iglesias],PHONE:[(   )    -],FAX:[(   )    -]] TOKEN:'[id number]',TOKEN:'[id number]'

## 2018-02-16 NOTE — PROGRESS NOTE ADULT - PROBLEM SELECTOR PLAN 8
-c/w home synthroid 100mg

## 2018-02-16 NOTE — PROGRESS NOTE ADULT - PROBLEM SELECTOR PLAN 6
Holding home antihypertensive in the setting of high risk for hemorrhage.

## 2018-02-16 NOTE — PROGRESS NOTE ADULT - ASSESSMENT
67 year old F who presents with 5 day history of gum bleeding and bruising in upper extremities and on further workup is found to have severe thrombocytopenia with platelet count of 2K. No CNS bleed or active bleeding episodes however pt having gum bleeding and epistaxis. 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 HIT and TTP ruled out.     #Thrombocytopenia  Thrombocytopenia secondary to Vancomycin induced ITP vs primary ITP (however unlikely given normal plt counts 2 weeks ago). Plt count improving, no evidence of mucocutaneous bleeding.    -s/p IVIG and Decadron 40 mg IV x 4 days; recommend holding off on further therapy and CTM CBC.   -Per primary team, patient being discharged today. She should f/u with Dr. Dunlap in 1 week with CBC prior to appointment.   -f/u drug dependent platelet antibodies testing at [facility] (vancomycin and levofloxacin)    #Anemia  Normocytic and new-onset. Anemia improved after transfusion of 1 unit. Improving possibly secondary to blood loss.     -CTM CBC  -Transfuse prBC to keep threshold Hb > 7 g/dL      Will discuss case with Dr. Dunlap

## 2018-02-16 NOTE — PROGRESS NOTE ADULT - SUBJECTIVE AND OBJECTIVE BOX
Interval Hx:  Plt count increased to 85K. ALYCIA negative so HIT ruled out. No mucocutaenous bleeding 48 hours. No fevers/chills overnight. She has no acute complaints.     Initial HPI:  ESTEPHANIE FLORES is a 67y Female with history of HTN, HLD and hypothyroidism who presents with 5 day history of gum bleeding and sites of injection. She was seen at [facility] ED on 1/24 for back pain. At that time, her hb and platelet count were normal. She had an elevated WBC count. Renal function WNL. She was discharged on analgesics however back pain worsened and she presented to [facility] few days later. She was subsequently found to have a T11-T12 osteomyelitis with abscess collection. She was started on IV Vancomycin and Levaquin. She was discharged on 2/5 with PICC line and to continue IV abx as outpatient. Of note, she was on a SQ blood thinner likely heparin or lovenox during that admission. No thrombocytopenia on discharge. She says she has never had thrombocytopenia in the past.     Gum bleeding began on 2/8. She initially thought gum bleeding was due to brushing her teeth but it continued to persist throughout weekend. She also noted bruising in sites of blood draws as well as where she SQ heparin during prior admission. She called her PCP Dr. Moise Hayden who had coags drawn on 2/10. Coags showed slightly prolonged INR and he recommended to come into ER to have IV Vitamin K x 1 dose. She denies hemarthroses. No BRBPR or melena. Per ED team, she had negative guiac. No hematuria.     No fevers/chills or night sweats. She notes weight loss due to reduced appetite for 1 week but no unintentional weight loss over the past few months. No leg pain or swelling.    In the ED, she had 1 episode of epistaxis.         Medications:  MEDICATIONS  (STANDING):  cefTRIAXone Injectable. 2000 milliGRAM(s) IV Push every 24 hours  dexamethasone  IVPB 40 milliGRAM(s) IV Intermittent daily  DULoxetine 60 milliGRAM(s) Oral daily  lactulose Syrup 10 Gram(s) Oral daily  levothyroxine 100 MICROGram(s) Oral daily    MEDICATIONS  (PRN):      PHYSICAL EXAM:    T(C): 36.4 (02-13-18 @ 04:52), Max: 36.8 (02-13-18 @ 02:00)  T(F): 97.6 (02-13-18 @ 04:52), Max: 98.3 (02-13-18 @ 02:00)  HR: 90 (02-13-18 @ 08:26) (82 - 111)  BP: 115/58 (02-13-18 @ 08:26) (96/58 - 118/55)  ABP: --  ABP(mean): --  RR: 19 (02-13-18 @ 04:00) (17 - 19)  SpO2: 93% (02-13-18 @ 08:26) (93% - 98%)      Gen: well developed, well nourished.   HEENT: NCAT, No conjunctival pallor, no scleral icterus. No petechiae or evidence of gum bleeding   Neck: supple, no JVD  Lymph nodes: No cervical, axillary or inguinal adenopathy  Cardiovascular: RR, nl S1S2, no murmurs/rubs/gallops  Respiratory: clear air entry b/l  Gastrointestinal: BS+, soft, NT/ND, no masses, no splenomegaly, no hepatomegaly, no evidence for ascites  Extremities: no clubbing/cyanosis, no edema, no calf tenderness  Vascular:  DP/PT 2+ b/l  Neurological: CN 2-12 grossly intact, no focal deficits; 5/5 strength in upper and lower extremities   Skin: no rash however ecchymoses noted bilaterally in upper extremities   BacK: no petechiae noted in lower back however ecchymoses noted on buttocks        Labs:                                     9.0    7.2   )-----------( 35       ( 15 Feb 2018 05:55 )             26.9         CBC Full  -  ( 15 Feb 2018 05:55 )  WBC Count : 7.2 K/uL  Hemoglobin : 9.0 g/dL  Hematocrit : 26.9 %  Platelet Count - Automated : 35 K/uL  Mean Cell Volume : 89.7 fL  Mean Cell Hemoglobin : 30.0 pg  Mean Cell Hemoglobin Concentration : 33.5 g/dL  Auto Neutrophil # : x  Auto Lymphocyte # : x  Auto Monocyte # : x  Auto Eosinophil # : x  Auto Basophil # : x  Auto Neutrophil % : x  Auto Lymphocyte % : x  Auto Monocyte % : x  Auto Eosinophil % : x  Auto Basophil % : x        02-15    141  |  101  |  40<H>  ----------------------------<  103<H>  4.0   |  28  |  1.20    Ca    9.1      15 Feb 2018 05:55  Phos  2.9     02-15  Mg     2.1     02-15          PT/INR - ( 14 Feb 2018 20:47 )   PT: 12.3 sec;   INR: 1.11          PTT - ( 14 Feb 2018 20:47 )  PTT:28.2 sec

## 2018-02-16 NOTE — PROGRESS NOTE ADULT - PROVIDER SPECIALTY LIST ADULT
Heme/Onc
Heme/Onc
Infectious Disease
Internal Medicine
Nephrology
Infectious Disease
Heme/Onc
Heme/Onc
Internal Medicine

## 2018-02-16 NOTE — PROGRESS NOTE ADULT - PROBLEM SELECTOR PLAN 4
Anemia work up negative for hemolysis, no active sign of GI bleed, no external sign of bleeding on physical exam.  -iron studies indicative of anemia of chronic disease, c/w OM  -b12, folate WNL  -s/p 1 U PRBC on 2/14  -trend CBC  -maintain active T&S  -transfuse for Hgb <7
Anemia work up negative for hemolysis, no active sign of GI bleed, no external sign of bleeding on physical exam.  -iron studies indicative of anemia of chronic disease, c/w OM  -b12, folate WNL  -s/p 1 U PRBC on 2/14  -trend CBC  -maintain active T&S  -transfuse for Hgb <7
Anemia work up negative for hemolysis, no active sign of GI bleed, no external sign of bleeding on physical exam.  -iron studies indicative of anemia of chronic disease, c/w OM  -b12, folate WNL  -trend CBC  -maintain active T&S  -transfuse for Hgb <7
Anemia work up negative for hemolysis, no active sign of GI bleed, no external sign of bleeding on physical exam.  -iron studies indicative of anemia of chronic disease, c/w OM  -b12, folate as per heme onc  -trend CBC  -maintain active T&S  -transfuse for Hgb <7
bacteremia 2/2 strep viridans - unclear source   - also with T11-12 OM/discitis   - stopped vancomycin and levquin - now on ceftriaxone 2g qd - f/u ID recs
Anemia work up negative for hemolysis, no active sign of GI bleed, no external sign of bleeding on physical exam.  -iron studies indicative of anemia of chronic disease, c/w OM  -b12, folate WNL  -s/p 1 U PRBC on 2/14  -trend CBC  -maintain active T&S  -transfuse for Hgb <7

## 2018-02-16 NOTE — PROGRESS NOTE ADULT - SUBJECTIVE AND OBJECTIVE BOX
OVERNIGHT EVENTS: YESENIA     SUBJECTIVE / INTERVAL HPI: Patient seen and examined at bedside. NAD. Patient doing well. No headache dizziness, n/v/chest pain sob. No hemoptysis, hemetemesis, blood in stool or urine.     VITAL SIGNS:  Vital Signs Last 24 Hrs  T(C): 36.5 (16 Feb 2018 08:51), Max: 37 (15 Feb 2018 23:49)  T(F): 97.7 (16 Feb 2018 08:51), Max: 98.6 (15 Feb 2018 23:49)  HR: 82 (16 Feb 2018 08:51) (72 - 97)  BP: 149/68 (16 Feb 2018 08:51) (144/76 - 167/79)  BP(mean): 109 (15 Feb 2018 16:02) (109 - 109)  RR: 18 (16 Feb 2018 08:51) (16 - 22)  SpO2: 97% (16 Feb 2018 08:51) (94% - 97%)    PHYSICAL EXAM:    Constitutional: NAD  HEENT:  petechia noted in oropharynx   Neck: Supple, no JVD  Respiratory: cta b/l   Cardiovascular: S1 S2, RRR no m/r/g.   Gastrointestinal: Petechia noted on abdomen improved from previous exam. +BS, soft NTND, no guarding or rebound tenderness, no palpable masses   Extremities: wwp; no cyanosis, clubbing or edema. Petechia noted on arms and legs   Vascular: 2+ pulses   Neurological: AAOx3, no CN deficits, strength and sensation intact throughout.       MEDICATIONS:  MEDICATIONS  (STANDING):  cefTRIAXone Injectable. 2000 milliGRAM(s) IV Push every 24 hours  DULoxetine 60 milliGRAM(s) Oral daily  enalapril 10 milliGRAM(s) Oral daily  lactulose Syrup 10 Gram(s) Oral daily  levothyroxine 100 MICROGram(s) Oral daily    MEDICATIONS  (PRN):      ALLERGIES:  Allergies    Levaquin (Other (Severe))  vancomycin (Other (Severe))    Intolerances        LABS:                        9.4    5.7   )-----------( 56       ( 15 Feb 2018 18:53 )             27.7     02-15    141  |  101  |  40<H>  ----------------------------<  103<H>  4.0   |  28  |  1.20    Ca    9.1      15 Feb 2018 05:55  Phos  2.9     02-15  Mg     2.1     02-15      PT/INR - ( 14 Feb 2018 20:47 )   PT: 12.3 sec;   INR: 1.11          PTT - ( 14 Feb 2018 20:47 )  PTT:28.2 sec    CAPILLARY BLOOD GLUCOSE          RADIOLOGY & ADDITIONAL TESTS: Reviewed.

## 2018-02-16 NOTE — PROGRESS NOTE ADULT - PROBLEM SELECTOR PLAN 9
Will hold off on pharmacological prophylaxis and SCDs given high risk of bleed.

## 2018-02-16 NOTE — PROGRESS NOTE ADULT - PROBLEM SELECTOR PLAN 5
Patient constipated for past 5 days, no BM, passing gas likely opiate induced ileus (Oxycontin 20 BID and oxycodone 10 PRN). Patient has not been able to tolerate PO but has been hungry. Has tried docusate and senna, has vomited up miralax. Abdomen benign on exam but dull to percussion bilateral lower quadrants  -Attempt lactulose, will avoid enemas while patient is thrombocytopenic   -abd xray w/o signs of obstruction
- on home enalparil and HCTZ - okay to restart ACEI/ARB for BP control
- on home enalparil and HCTZ - would hold for now until Cr improves further   - can give norvasc if BP becomes elevated
Patient constipated for past 5 days, no BM, passing gas likely opiate induced ileus (Oxycontin 20 BID and oxycodone 10 PRN). Patient has not been able to tolerate PO but has been hungry. Has tried docusate and senna, has vomited up miralax. Abdomen benign on exam but dull to percussion bilateral lower quadrants  -Attempt lactulose, will avoid enemas while patient is thrombocytopenic   -abd xray w/o signs of obstruction
on home enalparil and HCTZ - would hold for now until Cr improves further   - can give norvasc if BP becomes elevated
Patient constipated for past 5 days, no BM, passing gas likely opiate induced ileus (Oxycontin 20 BID and oxycodone 10 PRN). Patient has not been able to tolerate PO but has been hungry. Has tried docusate and senna, has vomited up miralax. Abdomen benign on exam but dull to percussion bilateral lower quadrants  -Attempt lactulose, will avoid enemas while patient is thrombocytopenic   -abd xray w/o signs of obstruction

## 2018-02-16 NOTE — PROGRESS NOTE ADULT - PROBLEM SELECTOR PLAN 2
Per patient, biopsy was negative at previous hospitalization, however found to be bacteremic. Per [facility], patient bacteremic with pan-sensitive strep viridans but discharged on Levaquin and vancomycin 2/2 esr decrease on these abx  -[facility] record in chart  -per ID, start ceftriaxone 2g IV Q24, Day 4  -trending ESR/CRP daily   -patient wishes to follow up with Dr. Iglesias for treatment of OM in outpatient

## 2018-02-16 NOTE — PROGRESS NOTE ADULT - PROBLEM SELECTOR PROBLEM 1
Thrombocytopenia
WIN (acute kidney injury)
Thrombocytopenia
Thrombocytopenia

## 2018-02-16 NOTE — DISCHARGE NOTE ADULT - CARE PROVIDER_API CALL
Moise Carlson), Medicine  [address] [facility] [address] [place] [address]  Phone: [phone]  Fax: [phone]    Emma Dunlap (MD), Medicine  [address]  [address] [place] [address]  Phone: [phone]  Fax: [phone]    Hilario Todd), Internal Medicine  [address]  [address] [place] [address]  Phone: [phone]  Fax: [phone]    Dr. Iglesias,   Phone: (   )    -  Fax: (   )    - Moise Carlson), Medicine  [address] [facility] [address] [place] [address]  Phone: [phone]  Fax: [phone]    Emma Dunlap (MD), Medicine  [address]  [address] [place] [address]  Phone: [phone]  Fax: [phone]    Hilario Todd), Internal Medicine  [address]  [address] [place] [address]  Phone: [phone]  Fax: [phone]

## 2018-02-16 NOTE — DISCHARGE NOTE ADULT - HOSPITAL COURSE
Patient is a 68 yo female with PMHx of hypothyroid, HTN, asthma who was sent to the ED for thrombocytopenia and bleeding. Patient was recently discharged from [facility] for spinal OM on imaging but not on biopsy (biopsy after abx) with PICC line for IV vancomycin/Levaquin, heparin flushes. Per [facility]/[place], the patient was being treated for pansensitive strep viridans.  She presented to today for gum bleeding that started on Wednesday, spontaneous, multiple episodes, and with epistaxis possibly for the past three days but noticed today. As per outpatient records: EDER WNL no vegetation, normal LV and RV size and function. Atherosclerotic disease of the aorta. MRI evidence of T11-T12 discitis/osteomyelitis with epidural phlegmon and small abscess, w/out significant cord compression. Possible ileus on 1/26. In the ED, she was afebrile, , BP 96/58, sat 97% on RA. CT head was negative for intracranial bleed. She was noticed to have platelet of 2, hematology was consulted and reviewed the smear, 1 unit of platelet ordered. She was given Xanax 0.5mg and started on a 4 day course of decadron 40mg daily. ICU was consulted and the patient was transferred to [facility] for further management. Per Heme, the thrombocytopenia was likely 2/2 Vancomycin induced immune thrombocytopenia, and her OP Abx were not continued. HIT labs were additionally sent. Following 2 Units of platelet transfusion, the patient continued to have a platelet count o f 2K. The patient was given IVIG 35mg, half dose 2/2 renal impairment, and was subsequently transfused an additional 1U Platelets with f/u CBC showing increase in platelets to 40. ID was consulted for continued treatment of OM and the patient was started on Ceftriaxone 2g daily, with plans to trend ESR and CRP to measure response. The following day platelets were decreased to 10k and she was given an additional treatment of IVIG 35mg and transfused 1U PRBC as Hgb was slowly downtrending and she is at increased risk for hemorrhage. A post transfusion CBC showed good response to PRBC and uptrending platelets that continued to the next morning. The patient showed no further signs of active bleeding in epistaxis or otherwise and was cleared for stepdown to [facility] for further management. Pt completed 4 day course of decadron. Repeat CBC showed plt count of 56 followed by 85.   During this admission, pt had HIT (T score of 5), ALYCIA negative even though PF4 is positive (non specific), ADAMS13 was  negative. vancomycin and levoquin drug associated antibodies were pending on discharge.   Pt stable to go home with opt followup with Dr. Serrano, Dr. Todd and PCP Dr. Carlson

## 2018-02-16 NOTE — DISCHARGE NOTE ADULT - CARE PLAN
Principal Discharge DX:	Thrombocytopenia Principal Discharge DX:	Thrombocytopenia  Goal:	Outpatient followup  Assessment and plan of treatment:	You were admitted to this hospital with bleeding and were found to have low platelet count. This decrease in platelets was likely due to Vancomycin and Levaquin. Please discontinue these medications and instead taking Ceftriaxone as listed below.   - Please followup with Dr. Dunlap on Feb 22nd 11:30am- for lab draw AND Feb 23rd @12pm for appointment   - Please also discontinue your home Hydrochlorothiazide for high blood pressure.   - If you develop any bleeding, brusing, headaches, dizziness, nausea, after discharge please immediately inform your PCP or return to ER  Secondary Diagnosis:	Osteomyelitis of spine  Assessment and plan of treatment:	-You were diagnosed with infection of the thoracic spine and were previously discharged on Vancomycin and Levaquin  Secondary Diagnosis:	Anemia  Secondary Diagnosis:	HTN (hypertension)  Secondary Diagnosis:	Thyroid disease  Secondary Diagnosis:	WIN (acute kidney injury) Principal Discharge DX:	Thrombocytopenia  Goal:	Outpatient followup  Assessment and plan of treatment:	You were admitted to this hospital with bleeding and were found to have low platelet count. This decrease in platelets was likely due to Vancomycin and Levaquin. Please discontinue these medications and instead taking Ceftriaxone as listed below.   - Please followup with Dr. Dunlap on Feb 22nd 11:30am- for lab draw AND Feb 23rd @12pm for appointment   - Please also discontinue your home Hydrochlorothiazide for high blood pressure.   - If you develop any bleeding, bruising, headaches, dizziness, nausea, after discharge please immediately inform your PCP or return to ER  Secondary Diagnosis:	Osteomyelitis of spine  Assessment and plan of treatment:	-You were diagnosed with infection of the thoracic spine and were previously discharged on Vancomycin and Levaquin. Please discontinue both these medications and instead take Ceftriaxone 2grams IV every 24 hours till March 9th.   - Please arrange with your PCP to obtain weekly CBC, CMP, ESR, CRP while you are on antibiotics to track progress.   - Please followup with Dr. Todd from Infectious diseases on Date listed below  Secondary Diagnosis:	Anemia  Assessment and plan of treatment:	-You were given 1 blood transfusion for low hemoglobin and you responded well. Please followup with your PCP for determine further need for blood transfusions  Secondary Diagnosis:	HTN (hypertension)  Assessment and plan of treatment:	-Please discontinue home Hydrochlorothiazide because it can potentially lower platelet count  - Instead, we recommend increasing your home Enalapril to 20mg daily   - Please followup with your PCP to make further changes  Secondary Diagnosis:	Thyroid disease  Assessment and plan of treatment:	Please continue your home synthroid  Secondary Diagnosis:	WIN (acute kidney injury)  Assessment and plan of treatment:	You had acute kidney injury when your came to the hospital. This was likely due to Vancomycin as well as low food and water intake. This injury resolved during this admission.   - Please ensure adequate hydration after discharge   - Please check kidney function with your PCP after discharge

## 2018-02-16 NOTE — DISCHARGE NOTE ADULT - ADDITIONAL INSTRUCTIONS
1) Dr. Dunlap- Feb 22nd 11:30am- for lab draw AND Feb 23rd @12pm for appointment   2) Dr. Todd (Infectious Diseases) February 26th @2:00 PM- Please call your insurance to check if you need referral from your PCP Dr. Olivarez.  3) Dr. Olivarez- (PCP)-Feb 19th @ 2:15pm

## 2018-02-16 NOTE — PROGRESS NOTE ADULT - PROBLEM SELECTOR PROBLEM 5
Constipation
HTN (hypertension)
Constipation

## 2018-02-16 NOTE — PROGRESS NOTE ADULT - PROBLEM SELECTOR PROBLEM 2
Osteomyelitis of spine
Thrombocytopenia
Osteomyelitis of spine

## 2018-02-16 NOTE — PROGRESS NOTE ADULT - PROBLEM SELECTOR PLAN 1
- patient presented with Cr 2.03 (baseline 0.69 on 1/24/18) - differential includes pre-renal secondary to dehydration/vomiting vs intrinsic secondary to drug toxicity 2/2 elevated vancomycin level; less likely post-renal/obstructive as making good urine  - Cr improving to 1.12 today   - FENa 0.4% and BUN:Cr 50:1 more consistent with pre-renal process; no eosinophils on differential, less likely AIN   - please obtain U/A to evaluate for any evidence of protein, WBC, RBCs, or casts  - encourage PO hydration   - avoid nephrotoxic meds  - monitor I/Os and serial BMPs

## 2018-02-16 NOTE — PROGRESS NOTE ADULT - PROBLEM SELECTOR PLAN 10
F: None  E: Replete lytes to keep K>4 and Mg>2  N: Dash diet  FULL CODE  Dispo: [facility]
F: None  E: Replete lytes to keep K>4 and Mg>2  N: Dash diet  FULL CODE  Dispo: 7 [facility]
F: None  E: Replete lytes to keep K>4 and Mg>2  N: Dash diet  FULL CODE  Dispo: [facility]

## 2018-02-16 NOTE — DISCHARGE NOTE ADULT - MEDICATION SUMMARY - MEDICATIONS TO STOP TAKING
I will STOP taking the medications listed below when I get home from the hospital:    Lipitor    hydroCHLOROthiazide    acetaminophen-oxyCODONE 325 mg-5 mg oral tablet  -- 1 tab(s) by mouth every 4 hours, As Needed MDD:6  -- Caution federal law prohibits the transfer of this drug to any person other  than the person for whom it was prescribed.  May cause drowsiness.  Alcohol may intensify this effect.  Use care when operating dangerous machinery.  This prescription cannot be refilled.  This product contains acetaminophen.  Do not use  with any other product containing acetaminophen to prevent possible liver damage.  Using more of this medication than prescribed may cause serious breathing problems.    hydroCHLOROthiazide  -- 50 milligram(s) by mouth once a day    Lipitor  -- 10 milligram(s) by mouth once a day (at bedtime)    oxyCODONE 10 mg oral tablet  -- 1 tab(s) by mouth every 6 hours, As Needed    OxyCONTIN 20 mg oral tablet, extended release  -- 1 tab(s) by mouth every 12 hours, As Needed for 7 days    levoFLOXacin 750 mg oral tablet  -- 1 tab(s) by mouth every 24 hours for 32 days    hydroCHLOROthiazide 50 mg oral tablet  -- 1 tab(s) by mouth once a day

## 2018-02-16 NOTE — PROGRESS NOTE ADULT - PROBLEM SELECTOR PLAN 3
Unclear etiology, may be 2/2 drug induced ATN given OP vancomycin use. S/p 2L fluid in the ED.  -FENa 0.4% indicating prerenal etiology, c/w prerenal given history of vomiting and decreased PO intake  -Cr downtrending  -trend Cr  -avoid nephrotoxic drugs, renally dose meds
- Hgb improved to 9.0 today  - iron panel wnl, LDH normal, Haptoglobin elevated  - TSH slightly low but not out of range
- Hgb stable at 9.5 today  - iron panel wnl, LDH normal, Haptoglobin elevated  - TSH slightly low but not out of range
- Hgb trending down from 13 --> 7.6 today  - iron panel wnl, LDH normal, Haptoglobin elevated  - TSH slightly low but not out of range   - may consider fecal occult given elevated BUN and downtrending Hgb in setting of thrombocytopenia
Unclear etiology, may be 2/2 drug induced ATN given OP vancomycin use. S/p 2L fluid in the ED.  -FENa 0.4% indicating prerenal etiology, c/w prerenal given history of vomiting and decreased PO intake  -Cr downtrending  -trend Cr  -avoid nephrotoxic drugs, renally dose meds
Unclear etiology, may be 2/2 drug induced ATN given OP vancomycin use. S/p 2L fluid in the ED.  -FENa 0.4% indicating prerenal etiology, c/w prerenal given history of vomiting and decreased PO intake  -Cr downtrending  -trend Cr  -avoid nephrotoxic drugs, renally dose meds
Unclear etiology, may be 2/2 drug induced ATN given OP vancomycin use. S/p 2L fluid in the ED.  -FENa 0.4% indicating prerenal etiology, c/w prerenal given history of vomiting and decreased PO intake  -trend Cr  -avoid nephrotoxic drugs, renally dose meds
Unclear etiology, may be 2/2 drug induced ATN given OP vancomycin use. S/p 2L fluid in the ED.  -FENa 0.4% indicating prerenal etiology, c/w prerenal given history of vomiting and decreased PO intake  -Cr downtrending  -trend Cr  -avoid nephrotoxic drugs, renally dose meds

## 2018-02-16 NOTE — DISCHARGE NOTE ADULT - MEDICATION SUMMARY - MEDICATIONS TO CHANGE
I will SWITCH the dose or number of times a day I take the medications listed below when I get home from the hospital:    enalapril  -- 10 milligram(s) by mouth once a day    enalapril 10 mg oral tablet  -- 1 tab(s) by mouth once a day

## 2018-02-16 NOTE — PROGRESS NOTE ADULT - SUBJECTIVE AND OBJECTIVE BOX
Patient is a 67y Female seen and evaluated at bedside. Reports feeling much better today. No further episodes of bleeding. Platelets improved to 85 and Cr improved to 1.12. Denies fever, chills, n/v, headache, chest pain, dyspnea, abdominal pain, dysuria.       cefTRIAXone Injectable. 2000 every 24 hours  DULoxetine 60 daily  enalapril 10 daily  lactulose Syrup 10 daily  levothyroxine 100 daily      Allergies    Levaquin (Other (Severe))  vancomycin (Other (Severe))    Intolerances        T(C): , Max: 37 (02-15-18 @ 23:49)  T(F): , Max: 98.6 (02-15-18 @ 23:49)  HR: 82 (02-16-18 @ 08:51)  BP: 149/68 (02-16-18 @ 08:51)  BP(mean): 109 (02-15-18 @ 16:02)  RR: 18 (02-16-18 @ 08:51)  SpO2: 97% (02-16-18 @ 08:51)  Wt(kg): --    02-15 @ 07:01  -  02-16 @ 07:00  --------------------------------------------------------  IN: 100 mL / OUT: 0 mL / NET: 100 mL      PHYSICAL EXAM:  Constitutional: WDWN resting comfortably in bed; NAD  Eyes: PERRL, EOMI, clear conjunctiva  ENT: no nasal discharge; uvula midline, MMM; petechia on hard palate much improved  Neck: supple; no JVD or thyromegaly  Respiratory: CTA B/L; no W/R/R, no retractions  Cardiac: +S1/S2; RRR; no M/R/G; PMI non-displaced  Gastrointestinal: soft, NT/ND; no rebound or guarding; +BSx4  Genitourinary: no CVA tenderness   Extremities: WWP, no clubbing or cyanosis; no peripheral edema  Musculoskeletal: NROM x4; no joint swelling, tenderness or erythema  Vascular: 2+ radial, femoral, DP/PT pulses B/L  Dermatologic: scattered nonblanching petechia throughout UE and LE, improving  Lymphatic: no submandibular or cervical LAD  Neurologic: AAOx3; CNII-XII grossly intact; no focal deficits        LABS:                        9.5    8.6   )-----------( 85       ( 16 Feb 2018 09:33 )             27.2     02-16    142  |  103  |  31<H>  ----------------------------<  102<H>  3.2<L>   |  24  |  1.12    Ca    9.0      16 Feb 2018 09:33  Phos  2.9     02-15  Mg     2.0     02-16        PT/INR - ( 14 Feb 2018 20:47 )   PT: 12.3 sec;   INR: 1.11     PTT - ( 14 Feb 2018 20:47 )  PTT:28.2 sec

## 2018-02-16 NOTE — DISCHARGE NOTE ADULT - PATIENT PORTAL LINK FT
You can access the [facility] Patient Portal, offered by [facility], by registering with the following website: [email or web address]

## 2018-02-16 NOTE — PROGRESS NOTE ADULT - PROBLEM SELECTOR PLAN 1
Presenting with platelet count of 2k with bleeding gums and diffuse petechial rash. 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  -less likely HIT (T score of 5), ALYCIA negative even though PF4 is positive (non specific). ADAMS13 negative which goes against TTP  - Last platelet count was 56. s/p 3 platelet transfusions   -Heme consulted f/u recs regarding need for further monitoring or interventions   -mildly increased fibrinogen, TSH mildly decreased  vanc associated antibody  -drug induced thrombocytopenia lab sent  -DC decadron 40mg, patient finished 4 day course  -s/p IVIG 35mg over 6 hours x2 for renal impairment, f/u heme regarding receiving additional dose   - WIll repeat CBC to check platelet count   -will hold off on further heparin and SCDs  -fall precautions

## 2018-02-16 NOTE — DISCHARGE NOTE ADULT - MEDICATION SUMMARY - MEDICATIONS TO TAKE
I will START or STAY ON the medications listed below when I get home from the hospital:    Blood Draw  -- Weekly Blood draw: CBC, CMP, ESR, CRP  Dr. Todd, Infectious Disease   NPI: [id number]  Fax#[phone]  -- Indication: For Osteomyelitis of spine    enalapril  -- 20 milligram(s) by mouth once a day  -- Indication: For HTN (hypertension)    DULoxetine 60 mg oral delayed release capsule  -- 1 cap(s) by mouth once a day  -- Indication: For Depression    Ambien 10 mg oral tablet  -- 1 tab(s) by mouth once a day (at bedtime)  -- Indication: For Depression    Breo Ellipta 100 mcg-25 mcg/inh inhalation powder  -- 1 puff(s) inhaled once a day  -- Indication: For Asthma    cefTRIAXone 2 g injection  -- 2 gram(s) intravenously once a day   Last dose on March 9th.   Dr. Todd, Infectious Disease   NPI: [id number]  Fax#[phone]  -- Indication: For Osteomyelitis of spine    Adderall 20 mg oral tablet  -- 20 milligram(s) by mouth once a day  -- Indication: For ADHD    lactulose 10 g/15 mL oral syrup  -- 15 milliliter(s) by mouth once a day  -- Indication: For Constipation    levothyroxine 100 mcg (0.1 mg) oral tablet  -- 1 tab(s) by mouth once a day  -- Indication: For Hypothyroidism

## 2018-02-16 NOTE — PROGRESS NOTE ADULT - PROBLEM SELECTOR PROBLEM 3
WIN (acute kidney injury)
Anemia
WIN (acute kidney injury)

## 2018-02-20 LAB — MISCELLANEOUS TEST NAME: SIGNIFICANT CHANGE UP

## 2018-02-21 DIAGNOSIS — I10 ESSENTIAL (PRIMARY) HYPERTENSION: ICD-10-CM

## 2018-02-21 DIAGNOSIS — M17.0 BILATERAL PRIMARY OSTEOARTHRITIS OF KNEE: ICD-10-CM

## 2018-02-21 DIAGNOSIS — D62 ACUTE POSTHEMORRHAGIC ANEMIA: ICD-10-CM

## 2018-02-21 DIAGNOSIS — E87.6 HYPOKALEMIA: ICD-10-CM

## 2018-02-21 DIAGNOSIS — I70.0 ATHEROSCLEROSIS OF AORTA: ICD-10-CM

## 2018-02-21 DIAGNOSIS — Z95.828 PRESENCE OF OTHER VASCULAR IMPLANTS AND GRAFTS: ICD-10-CM

## 2018-02-21 DIAGNOSIS — G06.1 INTRASPINAL ABSCESS AND GRANULOMA: ICD-10-CM

## 2018-02-21 DIAGNOSIS — N17.0 ACUTE KIDNEY FAILURE WITH TUBULAR NECROSIS: ICD-10-CM

## 2018-02-21 DIAGNOSIS — M46.20 OSTEOMYELITIS OF VERTEBRA, SITE UNSPECIFIED: ICD-10-CM

## 2018-02-21 DIAGNOSIS — K59.00 CONSTIPATION, UNSPECIFIED: ICD-10-CM

## 2018-02-21 DIAGNOSIS — E78.5 HYPERLIPIDEMIA, UNSPECIFIED: ICD-10-CM

## 2018-02-21 DIAGNOSIS — R21 RASH AND OTHER NONSPECIFIC SKIN ERUPTION: ICD-10-CM

## 2018-02-21 DIAGNOSIS — R04.0 EPISTAXIS: ICD-10-CM

## 2018-02-21 DIAGNOSIS — T36.95XA ADVERSE EFFECT OF UNSPECIFIED SYSTEMIC ANTIBIOTIC, INITIAL ENCOUNTER: ICD-10-CM

## 2018-02-21 DIAGNOSIS — J45.909 UNSPECIFIED ASTHMA, UNCOMPLICATED: ICD-10-CM

## 2018-02-21 DIAGNOSIS — E03.9 HYPOTHYROIDISM, UNSPECIFIED: ICD-10-CM

## 2018-02-21 DIAGNOSIS — Z96.652 PRESENCE OF LEFT ARTIFICIAL KNEE JOINT: ICD-10-CM

## 2018-02-21 DIAGNOSIS — M46.24 OSTEOMYELITIS OF VERTEBRA, THORACIC REGION: ICD-10-CM

## 2018-02-21 DIAGNOSIS — D69.59 OTHER SECONDARY THROMBOCYTOPENIA: ICD-10-CM

## 2018-02-21 DIAGNOSIS — Z79.891 LONG TERM (CURRENT) USE OF OPIATE ANALGESIC: ICD-10-CM

## 2018-02-21 DIAGNOSIS — F41.9 ANXIETY DISORDER, UNSPECIFIED: ICD-10-CM

## 2018-02-21 DIAGNOSIS — B95.5 UNSPECIFIED STREPTOCOCCUS AS THE CAUSE OF DISEASES CLASSIFIED ELSEWHERE: ICD-10-CM

## 2018-02-26 ENCOUNTER — APPOINTMENT (OUTPATIENT)
Dept: INFECTIOUS DISEASE | Facility: CLINIC | Age: 68
End: 2018-02-26
Payer: MEDICARE

## 2018-02-26 VITALS
DIASTOLIC BLOOD PRESSURE: 78 MMHG | WEIGHT: 167 LBS | HEIGHT: 61 IN | TEMPERATURE: 98 F | SYSTOLIC BLOOD PRESSURE: 147 MMHG | RESPIRATION RATE: 14 BRPM | BODY MASS INDEX: 31.53 KG/M2 | HEART RATE: 114 BPM | OXYGEN SATURATION: 98 %

## 2018-02-26 PROCEDURE — 99215 OFFICE O/P EST HI 40 MIN: CPT

## 2018-02-26 PROCEDURE — 99204 OFFICE O/P NEW MOD 45 MIN: CPT

## 2018-02-26 RX ORDER — ATORVASTATIN CALCIUM 10 MG/1
10 TABLET, FILM COATED ORAL DAILY
Refills: 0 | Status: ACTIVE | COMMUNITY

## 2018-02-26 RX ORDER — ENALAPRIL MALEATE 20 MG/1
20 TABLET ORAL DAILY
Refills: 0 | Status: ACTIVE | COMMUNITY

## 2018-03-07 ENCOUNTER — CLINICAL ADVICE (OUTPATIENT)
Age: 68
End: 2018-03-07

## 2018-03-08 ENCOUNTER — APPOINTMENT (OUTPATIENT)
Dept: INFECTIOUS DISEASE | Facility: CLINIC | Age: 68
End: 2018-03-08
Payer: MEDICARE

## 2018-03-08 VITALS
TEMPERATURE: 97.7 F | DIASTOLIC BLOOD PRESSURE: 79 MMHG | BODY MASS INDEX: 32.66 KG/M2 | HEART RATE: 118 BPM | HEIGHT: 61 IN | RESPIRATION RATE: 14 BRPM | OXYGEN SATURATION: 98 % | WEIGHT: 173 LBS | SYSTOLIC BLOOD PRESSURE: 138 MMHG

## 2018-03-08 PROCEDURE — 99215 OFFICE O/P EST HI 40 MIN: CPT

## 2018-03-08 RX ORDER — CEFTRIAXONE 2 G/1
2 INJECTION, POWDER, FOR SOLUTION INTRAMUSCULAR; INTRAVENOUS DAILY
Refills: 0 | Status: DISCONTINUED | COMMUNITY
End: 2018-03-08

## 2018-03-09 LAB
ALBUMIN SERPL ELPH-MCNC: 4 G/DL
ALP BLD-CCNC: 69 U/L
ALT SERPL-CCNC: 20 U/L
ANION GAP SERPL CALC-SCNC: 16 MMOL/L
AST SERPL-CCNC: 18 U/L
BILIRUB SERPL-MCNC: <0.2 MG/DL
BUN SERPL-MCNC: 22 MG/DL
CALCIUM SERPL-MCNC: 9.7 MG/DL
CHLORIDE SERPL-SCNC: 99 MMOL/L
CO2 SERPL-SCNC: 25 MMOL/L
CREAT SERPL-MCNC: 0.85 MG/DL
CRP SERPL-MCNC: 0.8 MG/DL
ERYTHROCYTE [SEDIMENTATION RATE] IN BLOOD BY WESTERGREN METHOD: 50 MM/HR
GLUCOSE SERPL-MCNC: 82 MG/DL
POTASSIUM SERPL-SCNC: 4.4 MMOL/L
PROCALCITONIN SERPL-MCNC: <0.05 NG/ML
PROT SERPL-MCNC: 6.9 G/DL
SODIUM SERPL-SCNC: 140 MMOL/L

## 2018-03-12 LAB
ADJUSTED MITOGEN: 1 IU/ML
ADJUSTED TB AG: 0 IU/ML
BRUCELLA  AB SCREEN IGM, S: NEGATIVE
BRUCELLA AB SCREEN IGG, S: NEGATIVE
INTERPRETATION: NORMAL
M TB IFN-G BLD-IMP: NEGATIVE
QUANTIFERON GOLD NIL: 0.01 IU/ML

## 2018-03-14 LAB — BACTERIA BLD CULT: NORMAL

## 2018-03-16 ENCOUNTER — CLINICAL ADVICE (OUTPATIENT)
Age: 68
End: 2018-03-16

## 2018-03-19 ENCOUNTER — OTHER (OUTPATIENT)
Age: 68
End: 2018-03-19

## 2018-03-19 NOTE — CHART NOTE - NSCHARTNOTEFT_GEN_A_CORE
T11-12 osteomyelitis/discitis. Order placed for CT-guided bone biopsy in [employer]. Please send tissue for Gram stain, culture, fungal culture, AFB culture, path. Discussed with Dr. Galicia.

## 2018-03-20 ENCOUNTER — APPOINTMENT (OUTPATIENT)
Dept: INTERVENTIONAL RADIOLOGY/VASCULAR | Facility: HOSPITAL | Age: 68
End: 2018-03-20
Payer: MEDICARE

## 2018-03-20 ENCOUNTER — OUTPATIENT (OUTPATIENT)
Dept: OUTPATIENT SERVICES | Facility: HOSPITAL | Age: 68
LOS: 1 days | End: 2018-03-20
Payer: MEDICARE

## 2018-03-20 ENCOUNTER — APPOINTMENT (OUTPATIENT)
Dept: CT IMAGING | Facility: HOSPITAL | Age: 68
End: 2018-03-20
Payer: MEDICARE

## 2018-03-20 DIAGNOSIS — Z96.652 PRESENCE OF LEFT ARTIFICIAL KNEE JOINT: Chronic | ICD-10-CM

## 2018-03-20 DIAGNOSIS — S46.112A STRAIN OF MUSCLE, FASCIA AND TENDON OF LONG HEAD OF BICEPS, LEFT ARM, INITIAL ENCOUNTER: Chronic | ICD-10-CM

## 2018-03-20 PROCEDURE — C1769: CPT

## 2018-03-20 PROCEDURE — 20225 BONE BIOPSY TROCAR/NDL DEEP: CPT

## 2018-03-20 PROCEDURE — 87116 MYCOBACTERIA CULTURE: CPT

## 2018-03-20 PROCEDURE — 87075 CULTR BACTERIA EXCEPT BLOOD: CPT

## 2018-03-20 PROCEDURE — 87070 CULTURE OTHR SPECIMN AEROBIC: CPT

## 2018-03-20 PROCEDURE — 87206 SMEAR FLUORESCENT/ACID STAI: CPT

## 2018-03-20 PROCEDURE — 87015 SPECIMEN INFECT AGNT CONCNTJ: CPT

## 2018-03-20 PROCEDURE — 77012 CT SCAN FOR NEEDLE BIOPSY: CPT | Mod: 26

## 2018-03-20 PROCEDURE — 87102 FUNGUS ISOLATION CULTURE: CPT

## 2018-03-20 PROCEDURE — 77012 CT SCAN FOR NEEDLE BIOPSY: CPT

## 2018-03-23 ENCOUNTER — CLINICAL ADVICE (OUTPATIENT)
Age: 68
End: 2018-03-23

## 2018-03-29 ENCOUNTER — APPOINTMENT (OUTPATIENT)
Dept: INFECTIOUS DISEASE | Facility: CLINIC | Age: 68
End: 2018-03-29
Payer: MEDICARE

## 2018-03-29 VITALS
RESPIRATION RATE: 14 BRPM | TEMPERATURE: 97.3 F | SYSTOLIC BLOOD PRESSURE: 148 MMHG | HEART RATE: 105 BPM | OXYGEN SATURATION: 98 % | DIASTOLIC BLOOD PRESSURE: 79 MMHG

## 2018-03-29 VITALS — WEIGHT: 173 LBS | BODY MASS INDEX: 32.66 KG/M2 | HEIGHT: 61 IN

## 2018-03-29 DIAGNOSIS — B37.2 CANDIDIASIS OF SKIN AND NAIL: ICD-10-CM

## 2018-03-29 PROCEDURE — 99214 OFFICE O/P EST MOD 30 MIN: CPT

## 2018-04-19 ENCOUNTER — APPOINTMENT (OUTPATIENT)
Dept: INFECTIOUS DISEASE | Facility: CLINIC | Age: 68
End: 2018-04-19
Payer: MEDICARE

## 2018-04-19 VITALS
TEMPERATURE: 98.4 F | BODY MASS INDEX: 32.28 KG/M2 | HEART RATE: 111 BPM | SYSTOLIC BLOOD PRESSURE: 111 MMHG | RESPIRATION RATE: 14 BRPM | OXYGEN SATURATION: 97 % | DIASTOLIC BLOOD PRESSURE: 74 MMHG | WEIGHT: 171 LBS | HEIGHT: 61 IN

## 2018-04-19 PROCEDURE — 99214 OFFICE O/P EST MOD 30 MIN: CPT

## 2018-05-03 ENCOUNTER — APPOINTMENT (OUTPATIENT)
Dept: INFECTIOUS DISEASE | Facility: CLINIC | Age: 68
End: 2018-05-03
Payer: MEDICARE

## 2018-05-03 VITALS
TEMPERATURE: 98.1 F | DIASTOLIC BLOOD PRESSURE: 84 MMHG | SYSTOLIC BLOOD PRESSURE: 141 MMHG | OXYGEN SATURATION: 100 % | HEART RATE: 110 BPM | BODY MASS INDEX: 32.1 KG/M2 | WEIGHT: 170 LBS | RESPIRATION RATE: 14 BRPM | HEIGHT: 61 IN

## 2018-05-03 DIAGNOSIS — M54.9 DORSALGIA, UNSPECIFIED: ICD-10-CM

## 2018-05-03 PROCEDURE — 99214 OFFICE O/P EST MOD 30 MIN: CPT

## 2018-05-17 ENCOUNTER — APPOINTMENT (OUTPATIENT)
Dept: INFECTIOUS DISEASE | Facility: CLINIC | Age: 68
End: 2018-05-17
Payer: MEDICARE

## 2018-05-17 VITALS
BODY MASS INDEX: 32.66 KG/M2 | WEIGHT: 173 LBS | SYSTOLIC BLOOD PRESSURE: 132 MMHG | HEIGHT: 61 IN | OXYGEN SATURATION: 98 % | HEART RATE: 111 BPM | TEMPERATURE: 98.3 F | RESPIRATION RATE: 14 BRPM | DIASTOLIC BLOOD PRESSURE: 78 MMHG

## 2018-05-17 PROCEDURE — 99214 OFFICE O/P EST MOD 30 MIN: CPT

## 2018-05-18 ENCOUNTER — MOBILE ON CALL (OUTPATIENT)
Age: 68
End: 2018-05-18

## 2018-09-06 PROBLEM — I10 ESSENTIAL (PRIMARY) HYPERTENSION: Chronic | Status: ACTIVE | Noted: 2017-10-20

## 2018-09-06 PROBLEM — E07.9 DISORDER OF THYROID, UNSPECIFIED: Chronic | Status: ACTIVE | Noted: 2017-10-20

## 2018-09-13 ENCOUNTER — APPOINTMENT (OUTPATIENT)
Dept: INFECTIOUS DISEASE | Facility: CLINIC | Age: 68
End: 2018-09-13

## 2018-10-11 ENCOUNTER — APPOINTMENT (OUTPATIENT)
Dept: INFECTIOUS DISEASE | Facility: CLINIC | Age: 68
End: 2018-10-11

## 2018-12-24 PROBLEM — B37.2 CANDIDAL INTERTRIGO: Status: ACTIVE | Noted: 2018-03-08

## 2019-01-24 ENCOUNTER — APPOINTMENT (OUTPATIENT)
Dept: INFECTIOUS DISEASE | Facility: CLINIC | Age: 69
End: 2019-01-24

## 2019-01-31 ENCOUNTER — LABORATORY RESULT (OUTPATIENT)
Age: 69
End: 2019-01-31

## 2019-01-31 ENCOUNTER — APPOINTMENT (OUTPATIENT)
Dept: INFECTIOUS DISEASE | Facility: CLINIC | Age: 69
End: 2019-01-31
Payer: MEDICARE

## 2019-01-31 VITALS
HEART RATE: 116 BPM | RESPIRATION RATE: 14 BRPM | OXYGEN SATURATION: 96 % | SYSTOLIC BLOOD PRESSURE: 140 MMHG | BODY MASS INDEX: 35.87 KG/M2 | TEMPERATURE: 97.8 F | WEIGHT: 190 LBS | DIASTOLIC BLOOD PRESSURE: 86 MMHG | HEIGHT: 61 IN

## 2019-01-31 DIAGNOSIS — D47.3 ESSENTIAL (HEMORRHAGIC) THROMBOCYTHEMIA: ICD-10-CM

## 2019-01-31 DIAGNOSIS — A49.1 STREPTOCOCCAL INFECTION, UNSPECIFIED SITE: ICD-10-CM

## 2019-01-31 DIAGNOSIS — R00.0 TACHYCARDIA, UNSPECIFIED: ICD-10-CM

## 2019-01-31 PROCEDURE — 99214 OFFICE O/P EST MOD 30 MIN: CPT

## 2019-01-31 RX ORDER — CEFEPIME 1 G/50ML
INJECTION, SOLUTION INTRAVENOUS
Refills: 0 | Status: DISCONTINUED | COMMUNITY
End: 2019-01-31

## 2019-01-31 RX ORDER — DAPTOMYCIN 350 MG/7ML
INJECTION, POWDER, LYOPHILIZED, FOR SOLUTION INTRAVENOUS
Refills: 0 | Status: COMPLETED | COMMUNITY
End: 2019-01-31

## 2019-01-31 RX ORDER — CLOTRIMAZOLE AND BETAMETHASONE DIPROPIONATE 10; .5 MG/G; MG/G
1-0.05 CREAM TOPICAL
Refills: 0 | Status: DISCONTINUED | COMMUNITY
End: 2019-01-31

## 2019-01-31 NOTE — REVIEW OF SYSTEMS
[Fever] : no fever [Chills] : no chills [Body Aches] : no body aches [Feeling Tired] : feeling tired [Recent Weight Gain (___ Lbs)] : recent [unfilled] ~Ulb weight gain [Chest Pain] : no chest pain [Palpitations] : no palpitations [Lower Ext Edema] : no lower extremity edema [Shortness Of Breath] : no shortness of breath [Cough] : no cough [Sputum] : not coughing up ~M sputum [Abdominal Pain] : no abdominal pain [Vomiting] : no vomiting [Diarrhea] : no diarrhea [Dysuria] : no dysuria [Joint Pain] : joint pain [Limb Pain] : limb pain [Skin Lesions] : no skin lesions

## 2019-01-31 NOTE — ASSESSMENT
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 Low suspicion for relapsed NVO at this time. Tachycardia in setting of ongoing Adderall use--patient advised to discuss cessation with PMD who prescribed this as ongoing use may cause cardiomyopathy; ?fatigue 2/2 Adderall withdrawal. ?RLE sciatica.\par - CBC, CMP, ESR, CRP\par - QUINCY and RF\par \par rtc prn [Anticipatory Guidance] : anticipatory guidance

## 2019-01-31 NOTE — PHYSICAL EXAM
[General Appearance - Alert] : alert [General Appearance - In No Acute Distress] : in no acute distress [General Appearance - Well Nourished] : well nourished [] : no respiratory distress [Respiration, Rhythm And Depth] : normal respiratory rhythm and effort [Auscultation Breath Sounds / Voice Sounds] : lungs were clear to auscultation bilaterally [Edema] : there was no peripheral edema [Bowel Sounds] : normal bowel sounds [Abdomen Soft] : soft [Abdomen Tenderness] : non-tender [Range of Motion to Joints] : range of motion to joints [Motor Tone] : muscle strength and tone were normal [FreeTextEntry1] : no spinous TTP; negative SLR b/l [Skin Lesions] : no skin lesions [No Focal Deficits] : no focal deficits [Oriented To Time, Place, And Person] : oriented to person, place, and time [Affect] : the affect was normal

## 2019-01-31 NOTE — HISTORY OF PRESENT ILLNESS
[FreeTextEntry1] : Endorsing L knee pain at site of prosthesis; no joint swelling, trauma; worse with bearing weight; has been going on for months. More recently had pain radiating down RLE over past few days. \son Denies back pain, fever or chills. Has gained weight since completing therapy for vertebral osteomyelitis in May.\son Feels fatigued; off note has been prescribed Adderall by her PMD for this.

## 2019-02-04 LAB
ALBUMIN SERPL ELPH-MCNC: 4.5 G/DL
ALP BLD-CCNC: 84 U/L
ALT SERPL-CCNC: 5 U/L
ANION GAP SERPL CALC-SCNC: 16 MMOL/L
AST SERPL-CCNC: 14 U/L
BASOPHILS # BLD AUTO: 0.08 K/UL
BASOPHILS NFR BLD AUTO: 0.9 %
BILIRUB SERPL-MCNC: 0.3 MG/DL
BUN SERPL-MCNC: 21 MG/DL
CALCIUM SERPL-MCNC: 10.7 MG/DL
CHLORIDE SERPL-SCNC: 97 MMOL/L
CO2 SERPL-SCNC: 26 MMOL/L
CREAT SERPL-MCNC: 0.94 MG/DL
CRP SERPL-MCNC: 1.14 MG/DL
EOSINOPHIL # BLD AUTO: 0.55 K/UL
EOSINOPHIL NFR BLD AUTO: 6 %
ERYTHROCYTE [SEDIMENTATION RATE] IN BLOOD BY WESTERGREN METHOD: 34 MM/HR
GLUCOSE SERPL-MCNC: 102 MG/DL
HCT VFR BLD CALC: 43.9 %
HGB BLD-MCNC: 14.3 G/DL
IMM GRANULOCYTES NFR BLD AUTO: 0.5 %
LYMPHOCYTES # BLD AUTO: 1.95 K/UL
LYMPHOCYTES NFR BLD AUTO: 21.4 %
MAN DIFF?: NORMAL
MCHC RBC-ENTMCNC: 29.4 PG
MCHC RBC-ENTMCNC: 32.6 GM/DL
MCV RBC AUTO: 90.3 FL
MONOCYTES # BLD AUTO: 0.64 K/UL
MONOCYTES NFR BLD AUTO: 7 %
NEUTROPHILS # BLD AUTO: 5.83 K/UL
NEUTROPHILS NFR BLD AUTO: 64.2 %
PLATELET # BLD AUTO: 450 K/UL
POTASSIUM SERPL-SCNC: 3.5 MMOL/L
PROT SERPL-MCNC: 7.2 G/DL
RBC # BLD: 4.86 M/UL
RBC # FLD: 13.4 %
RHEUMATOID FACT SER QL: <10 IU/ML
SODIUM SERPL-SCNC: 139 MMOL/L
WBC # FLD AUTO: 9.1 K/UL

## 2019-02-05 ENCOUNTER — CLINICAL ADVICE (OUTPATIENT)
Age: 69
End: 2019-02-05

## 2019-02-05 PROBLEM — D47.3 THROMBOCYTOSIS: Status: ACTIVE | Noted: 2019-02-05

## 2019-02-06 LAB — ANA SER IF-ACNC: NEGATIVE

## 2019-04-26 NOTE — ED ADULT NURSE NOTE - ABDOMEN
Left message on the answering machine. Her concerns regarding continuing use of topiramate such as paresthesia addressed. She is to call should she has any further questions or concerns. Best to call on Monday since everybody is out of the clinic now.  
0
nontender/nondistended/soft

## 2019-05-13 ENCOUNTER — APPOINTMENT (OUTPATIENT)
Dept: PULMONOLOGY | Facility: CLINIC | Age: 69
End: 2019-05-13
Payer: MEDICARE

## 2019-05-13 VITALS
OXYGEN SATURATION: 98 % | WEIGHT: 190 LBS | BODY MASS INDEX: 34.96 KG/M2 | HEART RATE: 122 BPM | SYSTOLIC BLOOD PRESSURE: 112 MMHG | DIASTOLIC BLOOD PRESSURE: 68 MMHG | HEIGHT: 62 IN | TEMPERATURE: 98.1 F

## 2019-05-13 PROCEDURE — 95012 NITRIC OXIDE EXP GAS DETER: CPT

## 2019-05-13 PROCEDURE — 99214 OFFICE O/P EST MOD 30 MIN: CPT | Mod: 25

## 2019-05-13 PROCEDURE — 94060 EVALUATION OF WHEEZING: CPT

## 2019-05-13 PROCEDURE — 71046 X-RAY EXAM CHEST 2 VIEWS: CPT

## 2019-05-14 NOTE — REASON FOR VISIT
[Follow-Up] : a follow-up visit [FreeTextEntry1] : cough for several months, asthmatic  recent issue with osteomyelitis

## 2019-05-14 NOTE — HISTORY OF PRESENT ILLNESS
[FreeTextEntry1] : 68 year old asthmatic, recent bout with osteomyletis,  was using nothing for the period of four months during the osteo.  was placed back on asmanex by cat for cough,  it did help, but not completely.  but cough continued. only using it once a day

## 2019-05-14 NOTE — DISCUSSION/SUMMARY
[FreeTextEntry1] : observed her using asmanex  very poor technique,  would actuate mdi when inspiration was almost completed.  could not get her to do it correctly so ultimately I gave her a spacer.\par \par will retrun in three weeks o see if things are better.\par \par discussed the concept of step therapy of asthma in great detail

## 2019-05-14 NOTE — PHYSICAL EXAM
[FreeTextEntry1] : obese [Normal Conjunctiva] : the conjunctiva exhibited no abnormalities [Eyelids - No Xanthelasma] : the eyelids demonstrated no xanthelasmas [Heart Rate And Rhythm] : heart rate and rhythm were normal [Heart Sounds] : normal S1 and S2 [Murmurs] : no murmurs present [Respiration, Rhythm And Depth] : normal respiratory rhythm and effort [Exaggerated Use Of Accessory Muscles For Inspiration] : no accessory muscle use [Auscultation Breath Sounds / Voice Sounds] : lungs were clear to auscultation bilaterally [Abdomen Soft] : soft [Abdomen Tenderness] : non-tender [Abnormal Walk] : normal gait [Abdomen Mass (___ Cm)] : no abdominal mass palpated [Nail Clubbing] : no clubbing of the fingernails [Gait - Sufficient For Exercise Testing] : the gait was sufficient for exercise testing [Petechial Hemorrhages (___cm)] : no petechial hemorrhages [Cyanosis, Localized] : no localized cyanosis [] : no rash [No Venous Stasis] : no venous stasis [Skin Color & Pigmentation] : normal skin color and pigmentation [Skin Lesions] : no skin lesions [No Skin Ulcers] : no skin ulcer [Deep Tendon Reflexes (DTR)] : deep tendon reflexes were 2+ and symmetric [No Xanthoma] : no  xanthoma was observed [No Focal Deficits] : no focal deficits [Sensation] : the sensory exam was normal to light touch and pinprick [Impaired Insight] : insight and judgment were intact [Affect] : the affect was normal [Oriented To Time, Place, And Person] : oriented to person, place, and time

## 2019-06-03 ENCOUNTER — APPOINTMENT (OUTPATIENT)
Dept: PULMONOLOGY | Facility: CLINIC | Age: 69
End: 2019-06-03
Payer: MEDICARE

## 2019-06-03 VITALS
DIASTOLIC BLOOD PRESSURE: 80 MMHG | SYSTOLIC BLOOD PRESSURE: 122 MMHG | HEIGHT: 62 IN | HEART RATE: 119 BPM | TEMPERATURE: 97 F | WEIGHT: 190 LBS | BODY MASS INDEX: 34.96 KG/M2 | OXYGEN SATURATION: 95 %

## 2019-06-03 PROCEDURE — 95012 NITRIC OXIDE EXP GAS DETER: CPT

## 2019-06-03 PROCEDURE — 94010 BREATHING CAPACITY TEST: CPT

## 2019-06-03 PROCEDURE — 99213 OFFICE O/P EST LOW 20 MIN: CPT | Mod: 25

## 2019-06-04 NOTE — REASON FOR VISIT
[FreeTextEntry1] : follow up for this woman with chronic cough found to have high niox but using inhaler wrong [Follow-Up] : a follow-up visit

## 2019-06-04 NOTE — PHYSICAL EXAM
[FreeTextEntry1] : obese [Normal Conjunctiva] : the conjunctiva exhibited no abnormalities [Eyelids - No Xanthelasma] : the eyelids demonstrated no xanthelasmas [Heart Rate And Rhythm] : heart rate and rhythm were normal [Murmurs] : no murmurs present [Heart Sounds] : normal S1 and S2 [Respiration, Rhythm And Depth] : normal respiratory rhythm and effort [Auscultation Breath Sounds / Voice Sounds] : lungs were clear to auscultation bilaterally [Abdomen Tenderness] : non-tender [Exaggerated Use Of Accessory Muscles For Inspiration] : no accessory muscle use [Abdomen Soft] : soft [Abdomen Mass (___ Cm)] : no abdominal mass palpated [Abnormal Walk] : normal gait [Gait - Sufficient For Exercise Testing] : the gait was sufficient for exercise testing [Nail Clubbing] : no clubbing of the fingernails [Petechial Hemorrhages (___cm)] : no petechial hemorrhages [Cyanosis, Localized] : no localized cyanosis [Skin Color & Pigmentation] : normal skin color and pigmentation [] : no rash [No Venous Stasis] : no venous stasis [No Skin Ulcers] : no skin ulcer [Skin Lesions] : no skin lesions [Deep Tendon Reflexes (DTR)] : deep tendon reflexes were 2+ and symmetric [No Xanthoma] : no  xanthoma was observed [Sensation] : the sensory exam was normal to light touch and pinprick [Impaired Insight] : insight and judgment were intact [No Focal Deficits] : no focal deficits [Oriented To Time, Place, And Person] : oriented to person, place, and time [Affect] : the affect was normal

## 2019-06-04 NOTE — REVIEW OF SYSTEMS
[Cough] : no cough [Dyspnea] : no dyspnea [Negative] : Gastrointestinal [FreeTextEntry8] : minimal cough

## 2019-06-04 NOTE — DISCUSSION/SUMMARY
[FreeTextEntry1] : continue inhaler as needed until cough resolves.  this may be seasonal and after the spring allergy season is over, it's worth ttrying to stop the therapy.

## 2019-06-04 NOTE — HISTORY OF PRESENT ILLNESS
[FreeTextEntry1] : woman with likely asthma, saw her two weeks ago with cough and poor inhaler technique and now return after given a spacer ( which she cannot use) but u sing better mdi technique an dher cough is much better.  and niox is lower.

## 2019-10-08 ENCOUNTER — CLINICAL ADVICE (OUTPATIENT)
Age: 69
End: 2019-10-08

## 2019-12-24 NOTE — PATIENT PROFILE ADULT. - LIVING ARRANGEMENTS, TEMPORARY FAMILY, PROFILE
History     Chief Complaint:  Pharyngitis      HPI   Nicole Myles is a 8 year old male who presents with his mother for evaluation of pharyngitis. The patient reports congestion, rhinorrhea, headache when shaking, fever of 101.9 degrees, sore throat, and sinus pain when blowing his nose since yesterday that worsening after waking up this morning. Mother reports patient has been around someone with strep as well as recent travel by airplane. He denies ear pain, abdominal pain, nausea, vomiting, diarrhea, cough, and rash. He has not had a flu shot this year.    Allergies:  Penicillins    Medications:    The patient is not currently taking any prescribed medications.    Past Medical History:    The patient does not have any past pertinent medical history.    Past Surgical History:    The patient does not have any past pertinent medical history.    Family History:    History reviewed. No pertinent family history.     Social History:  The patient presents to the emergency department with his mother.  The patient lives in [place].    Review of Systems   Constitutional: Positive for fever.   HENT: Positive for congestion, rhinorrhea, sinus pain and sore throat. Negative for ear pain.    Respiratory: Negative for cough.    Gastrointestinal: Negative for abdominal pain, diarrhea, nausea and vomiting.   Skin: Negative for rash.   Neurological: Positive for headaches.   All other systems reviewed and are negative.      Physical Exam     Patient Vitals for the past 24 hrs:   Temp Temp src Pulse SpO2 Weight   12/24/19 1547 99.7  F (37.6  C) Oral 109 97 % 43.5 kg (96 lb)         Physical Exam  Constitutional: Pleasant. Cooperative.   Eyes: Pupils equally round and reactive  HENT: Head is normal in appearance. TMs normal. Moist mucous membranes. Mild oropharyngeal erythema. No exudates. No palatal asymmetry. Uvula midline. No trismus. No muffled voice. Tolerating their secretions.  Cardiovascular: Regular rate and  rhythm.  Respiratory: Normal respiratory effort, lungs are clear bilaterally.  Neck: Normal ROM. No preauricular, postauricular, tonsillar, submandibular, submental, or cervical lymphadenopathy.   Skin: Normal, without rash.  Neurologic: Cranial nerves grossly intact. Alert.  Nursing notes and vital signs reviewed.    Emergency Department Course   Laboratory:  Rapid strep screen: negative  Influenza A/B antigen: B positive  Beta strep group A culture: Pending    Emergency Department Course:  Nursing notes and vitals reviewed. (9400) I performed an exam of the patient as documented above.     Medicine administered as documented above. Rapid strep and influenza obtained. This was sent to the lab for further testing, results above.     1710 I rechecked the patient and discussed the results of his workup thus far.     Findings and plan explained to the mother. Patient discharged home with instructions regarding supportive care, medications, and reasons to return. The importance of close follow-up was reviewed. The patient was prescribed Tamiflu.    I personally reviewed the laboratory results with the mother and answered all related questions prior to discharge.      Impression & Plan    Medical Decision Making:  Derek Myles is a 8 year old male who presents for evaluation of fever and sore throat.  They have other symptoms including rhinorrhea, headache, and sore throat.   Flu swab is positive for influenza B.   The patient is not of treatment window for influenza and medications ordered as noted below.  They are at risk for pneumonia but no signs of this are detected on today's visit.  Close followup of primary care physician is indicated and return to the ED for high fevers > 103 for more than 48 hours more, increasing productive cough, shortness of breath, or confusion.  There is no signs of serious bacterial infection such as bacteremia, meningitis, UTI/pyelonephritis, etc.  Strep swab is negative with culture  none required pending. All questions answered. Patient discharged to home in stable condition.    Diagnosis:    ICD-10-CM    1. Influenza B J10.1        Disposition:  discharged to home    Discharge Medications:  New Prescriptions    OSELTAMIVIR (TAMIFLU) 75 MG CAPSULE    Take 1 capsule (75 mg) by mouth 2 times daily for 5 days       Benjie Hawkins  12/24/2019    EMERGENCY DEPARTMENT  Scribe Disclosure:  I, Benjie Hawkins, am serving as a scribe at 3:52 PM on 12/24/2019 to document services personally performed by Alex Iniguez PA-C based on my observations and the provider's statements to me.        Alex Iniguez PA-C  12/24/19 9838

## 2020-02-27 ENCOUNTER — APPOINTMENT (OUTPATIENT)
Dept: INFECTIOUS DISEASE | Facility: CLINIC | Age: 70
End: 2020-02-27
Payer: MEDICARE

## 2020-02-27 VITALS
HEART RATE: 104 BPM | WEIGHT: 184 LBS | TEMPERATURE: 97.8 F | SYSTOLIC BLOOD PRESSURE: 126 MMHG | BODY MASS INDEX: 33.86 KG/M2 | HEIGHT: 62 IN | DIASTOLIC BLOOD PRESSURE: 78 MMHG | RESPIRATION RATE: 14 BRPM | OXYGEN SATURATION: 95 %

## 2020-02-27 DIAGNOSIS — M25.569 PAIN IN UNSPECIFIED KNEE: ICD-10-CM

## 2020-02-27 DIAGNOSIS — M86.9 OSTEOMYELITIS, UNSPECIFIED: ICD-10-CM

## 2020-02-27 PROCEDURE — 99214 OFFICE O/P EST MOD 30 MIN: CPT

## 2020-02-27 RX ORDER — DEXTROAMPHETAMINE SACCHARATE, AMPHETAMINE ASPARTATE, DEXTROAMPHETAMINE SULFATE, AND AMPHETAMINE SULFATE 3.75; 3.75; 3.75; 3.75 MG/1; MG/1; MG/1; MG/1
TABLET ORAL
Refills: 0 | Status: COMPLETED | COMMUNITY
End: 2020-02-27

## 2020-02-27 RX ORDER — OXYCODONE HYDROCHLORIDE AND ACETAMINOPHEN 325; 5 MG/5ML; MG/5ML
5-325 SOLUTION ORAL
Refills: 0 | Status: ACTIVE | COMMUNITY

## 2020-02-27 RX ORDER — CONJUGATED ESTROGENS AND MEDROXYPROGESTERONE ACETATE .3; 1.5 MG/1; MG/1
TABLET, SUGAR COATED ORAL
Refills: 0 | Status: ACTIVE | COMMUNITY

## 2020-02-27 NOTE — ASSESSMENT
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

## 2020-02-27 NOTE — PHYSICAL EXAM
[General Appearance - Alert] : alert [General Appearance - In No Acute Distress] : in no acute distress [Auscultation Breath Sounds / Voice Sounds] : lungs were clear to auscultation bilaterally [Heart Rate And Rhythm] : heart rate was normal and rhythm regular [Heart Sounds Gallop] : no gallops [Heart Sounds] : normal S1 and S2 [Murmurs] : no murmurs [Heart Sounds Pericardial Friction Rub] : no pericardial rub [Full Pulse] : the pedal pulses are present [Edema] : there was no peripheral edema [Abdomen Tenderness] : non-tender [Bowel Sounds] : normal bowel sounds [Abdomen Soft] : soft [Costovertebral Angle Tenderness] : no CVA tenderness [Abdomen Mass (___ Cm)] : no abdominal mass palpated [Musculoskeletal - Swelling] : no joint swelling [Nail Clubbing] : no clubbing  or cyanosis of the fingernails [Skin Color & Pigmentation] : normal skin color and pigmentation [FreeTextEntry1] : no spinous TTP [Motor Tone] : muscle strength and tone were normal [Sensation] : the sensory exam was normal to light touch and pinprick [] : no rash [Deep Tendon Reflexes (DTR)] : deep tendon reflexes were 2+ and symmetric [No Focal Deficits] : no focal deficits [Oriented To Time, Place, And Person] : oriented to person, place, and time [Affect] : the affect was normal

## 2020-02-27 NOTE — HISTORY OF PRESENT ILLNESS
[FreeTextEntry1] : Feels well. Presents today for ID assessment prior to planned dental visit and R TKR. Has been having b/l knee pain. Chronic back pain 2/2 sciatica but denies focal pain at thoracic spine. No fever, chills, or unintentional weight loss. She has stopped taking Adderall. She took an antibiotic (unsure which one) recently for ?sinus infection and developed severe malaise and depressive symptoms--improved since discontinuation.

## 2020-02-28 LAB
ALBUMIN SERPL ELPH-MCNC: 5 G/DL
ALP BLD-CCNC: 68 U/L
ALT SERPL-CCNC: 13 U/L
ANION GAP SERPL CALC-SCNC: 16 MMOL/L
AST SERPL-CCNC: 19 U/L
BASOPHILS # BLD AUTO: 0.1 K/UL
BASOPHILS NFR BLD AUTO: 1.4 %
BILIRUB SERPL-MCNC: 0.4 MG/DL
BUN SERPL-MCNC: 18 MG/DL
CALCIUM SERPL-MCNC: 10.8 MG/DL
CHLORIDE SERPL-SCNC: 96 MMOL/L
CO2 SERPL-SCNC: 28 MMOL/L
CREAT SERPL-MCNC: 0.82 MG/DL
CRP SERPL-MCNC: 0.95 MG/DL
EOSINOPHIL # BLD AUTO: 0.45 K/UL
EOSINOPHIL NFR BLD AUTO: 6.1 %
ERYTHROCYTE [SEDIMENTATION RATE] IN BLOOD BY WESTERGREN METHOD: 41 MM/HR
GLUCOSE SERPL-MCNC: 97 MG/DL
HCT VFR BLD CALC: 44.9 %
HGB BLD-MCNC: 14.9 G/DL
IMM GRANULOCYTES NFR BLD AUTO: 0.1 %
LYMPHOCYTES # BLD AUTO: 2.06 K/UL
LYMPHOCYTES NFR BLD AUTO: 28.1 %
MAN DIFF?: NORMAL
MCHC RBC-ENTMCNC: 30.5 PG
MCHC RBC-ENTMCNC: 33.2 GM/DL
MCV RBC AUTO: 92 FL
MONOCYTES # BLD AUTO: 0.66 K/UL
MONOCYTES NFR BLD AUTO: 9 %
NEUTROPHILS # BLD AUTO: 4.05 K/UL
NEUTROPHILS NFR BLD AUTO: 55.3 %
PLATELET # BLD AUTO: 430 K/UL
POTASSIUM SERPL-SCNC: 3.8 MMOL/L
PROT SERPL-MCNC: 7.1 G/DL
RBC # BLD: 4.88 M/UL
RBC # FLD: 12.8 %
SODIUM SERPL-SCNC: 140 MMOL/L
WBC # FLD AUTO: 7.33 K/UL

## 2020-08-01 NOTE — ED ADULT NURSE NOTE - NSSISCREENINGQ4_ED_A_ED
EKG and labs reviewed, and results discussed with pt. She was given ativan in the ED with resolution of sx's and now reports feeling much better. She is A&Ox3 and is sitting comfortably in NAD. Asymptomatic with no other complaints at this time. Will D/C with instructions to F/U with her PCP and with Cardiology this week. Strict return precautions reviewed with pt in which pt verbalizes understanding and agrees to. No

## 2021-02-09 NOTE — ED ADULT NURSE NOTE - CAS EDN INTEG ASSESS
KRIS: [address] [facility] has accepted patient and Ron Jeong has been approved. Noted negative covid test 2/8. [facility] phone [phone] transport time set for 4 PM.    Chart reviewed. CM updated patient at bedside. Patient continues to be apprehensive about SNF placement. Radha Roy approved, Auth ID# [id number]. Approved for 3 days, next review date is 2/11. Fax clinicals to P#[phone]. CM spoke with [facility]. They can accept patient today, and will accept a rapid rest. CM notified NP. Medicare pt has received, reviewed, and signed 2nd IM letter informing them of their right to appeal the discharge. Signed copy has been placed on pt bedside chart.       HEAVENLY RodriguezW/CRM WDL

## 2021-04-08 DIAGNOSIS — Z00.00 ENCOUNTER FOR GENERAL ADULT MEDICAL EXAMINATION W/OUT ABNORMAL FINDINGS: ICD-10-CM

## 2021-04-16 ENCOUNTER — NON-APPOINTMENT (OUTPATIENT)
Age: 71
End: 2021-04-16

## 2021-04-16 ENCOUNTER — APPOINTMENT (OUTPATIENT)
Dept: PULMONOLOGY | Facility: CLINIC | Age: 71
End: 2021-04-16
Payer: MEDICARE

## 2021-04-16 VITALS
OXYGEN SATURATION: 96 % | WEIGHT: 169 LBS | DIASTOLIC BLOOD PRESSURE: 73 MMHG | SYSTOLIC BLOOD PRESSURE: 125 MMHG | BODY MASS INDEX: 31.1 KG/M2 | HEART RATE: 100 BPM | HEIGHT: 62 IN | TEMPERATURE: 97.8 F

## 2021-04-16 PROCEDURE — 99214 OFFICE O/P EST MOD 30 MIN: CPT | Mod: 25

## 2021-04-16 PROCEDURE — 95012 NITRIC OXIDE EXP GAS DETER: CPT

## 2021-04-16 PROCEDURE — 94010 BREATHING CAPACITY TEST: CPT

## 2021-04-17 NOTE — DISCUSSION/SUMMARY
[FreeTextEntry1] : placed her on breo which should should have been on all the time,\par \par even before now she had stopped the breo and ended up on a single controller\par \par and now  on only rescue\par \par cat allergy shots is interesting, is it effective wihtout allergic rhiinitis

## 2021-04-17 NOTE — PHYSICAL EXAM
[No Acute Distress] : no acute distress [Normal Oropharynx] : normal oropharynx [Normal Appearance] : normal appearance [No Neck Mass] : no neck mass [Normal Rate/Rhythm] : normal rate/rhythm [Normal S1, S2] : normal s1, s2 [No Murmurs] : no murmurs [No Resp Distress] : no resp distress [Clear to Auscultation Bilaterally] : clear to auscultation bilaterally [Wheeze] : wheeze [No Abnormalities] : no abnormalities [Benign] : benign [Normal Gait] : normal gait [No Clubbing] : no clubbing [No Cyanosis] : no cyanosis [No Edema] : no edema [FROM] : FROM [Normal Color/ Pigmentation] : normal color/ pigmentation [No Focal Deficits] : no focal deficits [Oriented x3] : oriented x3 [Normal Affect] : normal affect [TextBox_68] : mild wheeze

## 2021-04-17 NOTE — HISTORY OF PRESENT ILLNESS
[TextBox_4] : wanted to go to dr. shelton  for shots. had seen him in the past, appears to have forgotten every thing spoken to about asthma care and only use rescue inhaler\par \par radha garcia wants to be \par \par no controller\par \par just rescue inhaler, \par \par not feeling well is congested , has cat and is allergic to it, hence allergy shots\par \par healthyh now in other ways\par \par smoker in the past,  small

## 2021-04-20 NOTE — PATIENT PROFILE ADULT. - TEACHING/LEARNING LEARNING PREFERENCES
Detail Level: Simple Render Risk Assessment In Note?: no Additional Notes: Pt advised to go to A&E for a consult to discuss what options would work best for her. verbal instruction

## 2021-04-28 ENCOUNTER — APPOINTMENT (OUTPATIENT)
Dept: ORTHOPEDIC SURGERY | Facility: CLINIC | Age: 71
End: 2021-04-28
Payer: MEDICARE

## 2021-04-28 VITALS — SYSTOLIC BLOOD PRESSURE: 123 MMHG | DIASTOLIC BLOOD PRESSURE: 79 MMHG | HEART RATE: 92 BPM

## 2021-04-28 VITALS — HEIGHT: 62 IN | WEIGHT: 168 LBS | BODY MASS INDEX: 30.91 KG/M2

## 2021-04-28 DIAGNOSIS — Z82.61 FAMILY HISTORY OF ARTHRITIS: ICD-10-CM

## 2021-04-28 DIAGNOSIS — Z78.9 OTHER SPECIFIED HEALTH STATUS: ICD-10-CM

## 2021-04-28 DIAGNOSIS — Z86.79 PERSONAL HISTORY OF OTHER DISEASES OF THE CIRCULATORY SYSTEM: ICD-10-CM

## 2021-04-28 DIAGNOSIS — Z86.39 PERSONAL HISTORY OF OTHER ENDOCRINE, NUTRITIONAL AND METABOLIC DISEASE: ICD-10-CM

## 2021-04-28 DIAGNOSIS — Z87.09 PERSONAL HISTORY OF OTHER DISEASES OF THE RESPIRATORY SYSTEM: ICD-10-CM

## 2021-04-28 DIAGNOSIS — Z87.39 PERSONAL HISTORY OF OTHER DISEASES OF THE MUSCULOSKELETAL SYSTEM AND CONNECTIVE TISSUE: ICD-10-CM

## 2021-04-28 PROCEDURE — 73564 X-RAY EXAM KNEE 4 OR MORE: CPT | Mod: 50

## 2021-04-28 PROCEDURE — 99202 OFFICE O/P NEW SF 15 MIN: CPT

## 2021-04-28 RX ORDER — LEVOTHYROXINE SODIUM 0.1 MG/1
100 TABLET ORAL
Refills: 0 | Status: ACTIVE | COMMUNITY

## 2021-04-28 NOTE — HISTORY OF PRESENT ILLNESS
[de-identified] : 69 y/o female presents for right knee pain. Patient states that shes had ongoing knee pain since 2019 with a hx of a left knee TKR.  She states that in the past she saw Dr. Solorzano for both her knees and she was in the works on getting a Rt TKR however, she was hospitalized due to a health condition, following COVID so she never followed through with the surgery. Currently, she states that she has joint knee throughout her entire knee cap and pain with walking, stair, and dancing. She mentions she has buckling and stiffness and radiating pain to her thigh and ankle.She has been referred to our office by her pcp, she is interested in learning more abour regenerative medicine and eager to resume normal activities since she is a new grandmother.

## 2021-04-28 NOTE — PHYSICAL EXAM
[de-identified] : Right knee ROM 0-90 125 degrees. \par Medial joint tenderness neg Jay Jay and Lachman. \par  [de-identified] : 4 views of both knees show left knee with good TKA right knee with varus medial joint OA KL3+\par

## 2021-05-17 ENCOUNTER — APPOINTMENT (OUTPATIENT)
Dept: PULMONOLOGY | Facility: CLINIC | Age: 71
End: 2021-05-17
Payer: MEDICARE

## 2021-05-17 VITALS
SYSTOLIC BLOOD PRESSURE: 123 MMHG | TEMPERATURE: 97.8 F | WEIGHT: 168 LBS | OXYGEN SATURATION: 97 % | DIASTOLIC BLOOD PRESSURE: 80 MMHG | HEART RATE: 111 BPM | HEIGHT: 62 IN | BODY MASS INDEX: 30.91 KG/M2

## 2021-05-17 PROCEDURE — 99214 OFFICE O/P EST MOD 30 MIN: CPT | Mod: 25

## 2021-05-17 PROCEDURE — 94010 BREATHING CAPACITY TEST: CPT

## 2021-05-18 NOTE — REASON FOR VISIT
[Follow-Up] : a follow-up visit [TextBox_44] : asthmatic here for follow up after going back on breo

## 2021-05-18 NOTE — HISTORY OF PRESENT ILLNESS
[TextBox_4] : much better on low dose breo\par \par some Oral side effects\par \par cat wanted her on higher doses\par \par discussed this. at length

## 2021-05-18 NOTE — DISCUSSION/SUMMARY
[FreeTextEntry1] : she is asymptomatic on these meds\par \par no need for higher dose\par \par may need allergy shots for rhiniits\par \par will discuss with cat

## 2021-05-18 NOTE — PHYSICAL EXAM
[No Acute Distress] : no acute distress [Normal Oropharynx] : normal oropharynx [Normal Appearance] : normal appearance [No Neck Mass] : no neck mass [Normal Rate/Rhythm] : normal rate/rhythm [Normal S1, S2] : normal s1, s2 [No Murmurs] : no murmurs [No Resp Distress] : no resp distress [Clear to Auscultation Bilaterally] : clear to auscultation bilaterally [Wheeze] : wheeze [No Abnormalities] : no abnormalities [Benign] : benign [Normal Gait] : normal gait [No Clubbing] : no clubbing [No Cyanosis] : no cyanosis [No Edema] : no edema [FROM] : FROM [Normal Color/ Pigmentation] : normal color/ pigmentation [No Focal Deficits] : no focal deficits [Oriented x3] : oriented x3 [Normal Affect] : normal affect [TextBox_68] : no wheeze

## 2021-05-18 NOTE — PROCEDURE
[FreeTextEntry1] : niox is down from 103 to 33\par \par flow obstruction is minimally better\par \par but symptoms are much improved

## 2021-08-17 ENCOUNTER — APPOINTMENT (OUTPATIENT)
Dept: PULMONOLOGY | Facility: CLINIC | Age: 71
End: 2021-08-17
Payer: MEDICARE

## 2021-08-17 VITALS
WEIGHT: 168 LBS | HEIGHT: 62 IN | HEART RATE: 88 BPM | DIASTOLIC BLOOD PRESSURE: 78 MMHG | SYSTOLIC BLOOD PRESSURE: 130 MMHG | BODY MASS INDEX: 30.91 KG/M2 | OXYGEN SATURATION: 97 % | TEMPERATURE: 97.9 F

## 2021-08-17 PROCEDURE — 99213 OFFICE O/P EST LOW 20 MIN: CPT

## 2021-08-18 NOTE — HISTORY OF PRESENT ILLNESS
[TextBox_4] : doing great. low dose breo\par osteoporosis needs to be addressed\par \par no spiroemtry today because she feels well.\par \par will refer to oste

## 2021-08-18 NOTE — DISCUSSION/SUMMARY
[FreeTextEntry1] : we discussed inh steroids and bone density\par \par will have her see endocrinology about osteoporosis\par \par return in one year.  low dose breo

## 2021-12-25 LAB — SARS-COV-2 N GENE NPH QL NAA+PROBE: NOT DETECTED

## 2022-03-01 NOTE — ED ADULT NURSE NOTE - CAS DISCH CONDITION
Stable Eucrisa Counseling: Patient may experience a mild burning sensation during topical application. Eucrisa is not approved in children less than 2 years of age.

## 2022-04-13 ENCOUNTER — APPOINTMENT (OUTPATIENT)
Dept: OPHTHALMOLOGY | Facility: CLINIC | Age: 72
End: 2022-04-13

## 2022-04-25 ENCOUNTER — APPOINTMENT (OUTPATIENT)
Dept: OPHTHALMOLOGY | Facility: CLINIC | Age: 72
End: 2022-04-25

## 2022-04-25 ENCOUNTER — OUTPATIENT (OUTPATIENT)
Dept: OUTPATIENT SERVICES | Facility: HOSPITAL | Age: 72
LOS: 1 days | End: 2022-04-25

## 2022-04-25 DIAGNOSIS — S46.112A STRAIN OF MUSCLE, FASCIA AND TENDON OF LONG HEAD OF BICEPS, LEFT ARM, INITIAL ENCOUNTER: Chronic | ICD-10-CM

## 2022-04-25 DIAGNOSIS — Z96.652 PRESENCE OF LEFT ARTIFICIAL KNEE JOINT: Chronic | ICD-10-CM

## 2022-04-27 DIAGNOSIS — H26.9 UNSPECIFIED CATARACT: ICD-10-CM

## 2022-05-26 ENCOUNTER — APPOINTMENT (OUTPATIENT)
Dept: INFECTIOUS DISEASE | Facility: CLINIC | Age: 72
End: 2022-05-26

## 2022-06-27 ENCOUNTER — APPOINTMENT (OUTPATIENT)
Dept: INFECTIOUS DISEASE | Facility: CLINIC | Age: 72
End: 2022-06-27

## 2022-06-27 VITALS
HEART RATE: 93 BPM | BODY MASS INDEX: 32.76 KG/M2 | WEIGHT: 178 LBS | TEMPERATURE: 97.2 F | DIASTOLIC BLOOD PRESSURE: 70 MMHG | HEIGHT: 62 IN | RESPIRATION RATE: 16 BRPM | SYSTOLIC BLOOD PRESSURE: 118 MMHG | OXYGEN SATURATION: 97 %

## 2022-06-27 DIAGNOSIS — M17.11 UNILATERAL PRIMARY OSTEOARTHRITIS, RIGHT KNEE: ICD-10-CM

## 2022-06-27 PROCEDURE — 99214 OFFICE O/P EST MOD 30 MIN: CPT

## 2022-06-27 RX ORDER — ALPRAZOLAM 0.5 MG/1
0.5 TABLET ORAL
Qty: 120 | Refills: 0 | Status: ACTIVE | COMMUNITY
Start: 2022-06-07

## 2022-06-27 RX ORDER — OXYCODONE AND ACETAMINOPHEN 7.5; 325 MG/1; MG/1
7.5-325 TABLET ORAL
Qty: 120 | Refills: 0 | Status: ACTIVE | COMMUNITY
Start: 2022-06-07

## 2022-06-27 NOTE — ASSESSMENT
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

## 2022-06-27 NOTE — PHYSICAL EXAM
[General Appearance - Alert] : alert [General Appearance - In No Acute Distress] : in no acute distress [Auscultation Breath Sounds / Voice Sounds] : lungs were clear to auscultation bilaterally [Heart Rate And Rhythm] : heart rate was normal and rhythm regular [Heart Sounds] : normal S1 and S2 [Heart Sounds Gallop] : no gallops [Murmurs] : no murmurs [Heart Sounds Pericardial Friction Rub] : no pericardial rub [Bowel Sounds] : normal bowel sounds [Abdomen Soft] : soft [Abdomen Tenderness] : non-tender [] : no hepato-splenomegaly [Abdomen Mass (___ Cm)] : no abdominal mass palpated [Costovertebral Angle Tenderness] : no CVA tenderness [Deep Tendon Reflexes (DTR)] : deep tendon reflexes were 2+ and symmetric [Sensation] : the sensory exam was normal to light touch and pinprick [No Focal Deficits] : no focal deficits [Oriented To Time, Place, And Person] : oriented to person, place, and time [Affect] : the affect was normal

## 2022-07-08 ENCOUNTER — APPOINTMENT (OUTPATIENT)
Dept: PULMONOLOGY | Facility: CLINIC | Age: 72
End: 2022-07-08

## 2022-07-08 VITALS
OXYGEN SATURATION: 97 % | HEIGHT: 62 IN | WEIGHT: 174 LBS | BODY MASS INDEX: 32.02 KG/M2 | SYSTOLIC BLOOD PRESSURE: 132 MMHG | HEART RATE: 86 BPM | TEMPERATURE: 97.9 F | DIASTOLIC BLOOD PRESSURE: 72 MMHG

## 2022-07-08 PROCEDURE — 94010 BREATHING CAPACITY TEST: CPT

## 2022-07-08 PROCEDURE — 99213 OFFICE O/P EST LOW 20 MIN: CPT | Mod: 25

## 2022-07-09 NOTE — PHYSICAL EXAM
[No Acute Distress] : no acute distress [Normal Oropharynx] : normal oropharynx [No Neck Mass] : no neck mass [Normal Appearance] : normal appearance [Normal Rate/Rhythm] : normal rate/rhythm [Normal S1, S2] : normal s1, s2 [No Murmurs] : no murmurs [No Resp Distress] : no resp distress [Clear to Auscultation Bilaterally] : clear to auscultation bilaterally [No Abnormalities] : no abnormalities [Wheeze] : wheeze [Benign] : benign [No Clubbing] : no clubbing [No Cyanosis] : no cyanosis [No Edema] : no edema [FROM] : FROM [Normal Color/ Pigmentation] : normal color/ pigmentation [No Focal Deficits] : no focal deficits [Oriented x3] : oriented x3 [Normal Affect] : normal affect [TextBox_99] : requires a walker for ambulation due to osteomyelitis in the past [TextBox_68] : no wheeze

## 2022-07-09 NOTE — DISCUSSION/SUMMARY
[FreeTextEntry1] : return in one year\par \par asthmat is stable\par \par spoke about further management decisions at length \par \par and loss of cat and it's inflence on her asthma

## 2022-07-09 NOTE — HISTORY OF PRESENT ILLNESS
[TextBox_4] : doing well on breo-    has orthopedic issues\par \par recently put cat to sleep and this helped her breahting,\par \par but stilil with airflow obstruction\par \par her major problem in her life was osteomyelitis who she still sees an ID person for for follow up\par \par this has left her residual disability\par \par

## 2022-08-01 NOTE — HISTORY OF PRESENT ILLNESS
[FreeTextEntry1] : Presents today in anticipation of upcoming R TKR planned for 9/2022 and need for antibiotic ppx. Rx sent for cephalexin for ppx prior to dental work on 6/8; additional planned procedures and dentist has rx additional cephalexin in anticipation of these. She endorses R knee pain as well as shoulder pain. Recently saw rheum at [facility] for arthritis work up--reportedly inconclusive for RA; planned to start on hydroxychloroquine.  13:45

## 2023-01-09 ENCOUNTER — APPOINTMENT (OUTPATIENT)
Dept: INFECTIOUS DISEASE | Facility: CLINIC | Age: 73
End: 2023-01-09
Payer: MEDICARE

## 2023-01-09 VITALS
SYSTOLIC BLOOD PRESSURE: 134 MMHG | WEIGHT: 164.13 LBS | TEMPERATURE: 96 F | BODY MASS INDEX: 30.2 KG/M2 | OXYGEN SATURATION: 98 % | HEART RATE: 104 BPM | DIASTOLIC BLOOD PRESSURE: 72 MMHG | HEIGHT: 62 IN

## 2023-01-09 DIAGNOSIS — T50.905A OTHER SECONDARY THROMBOCYTOPENIA: ICD-10-CM

## 2023-01-09 DIAGNOSIS — D69.59 OTHER SECONDARY THROMBOCYTOPENIA: ICD-10-CM

## 2023-01-09 PROCEDURE — 99214 OFFICE O/P EST MOD 30 MIN: CPT

## 2023-01-09 RX ORDER — DULOXETINE HYDROCHLORIDE 60 MG/1
60 CAPSULE, DELAYED RELEASE PELLETS ORAL
Refills: 0 | Status: COMPLETED | COMMUNITY
End: 2023-01-09

## 2023-01-09 RX ORDER — OFLOXACIN 3 MG/ML
0.3 SOLUTION/ DROPS OPHTHALMIC
Qty: 5 | Refills: 0 | Status: COMPLETED | COMMUNITY
Start: 2022-06-20 | End: 2023-01-09

## 2023-01-09 RX ORDER — DIFLUPREDNATE 0.5 MG/ML
0.05 EMULSION OPHTHALMIC
Qty: 5 | Refills: 0 | Status: COMPLETED | COMMUNITY
Start: 2022-06-20 | End: 2023-01-09

## 2023-01-09 RX ORDER — CEPHALEXIN 500 MG/1
500 TABLET ORAL
Qty: 20 | Refills: 0 | Status: COMPLETED | COMMUNITY
Start: 2022-06-10 | End: 2023-01-09

## 2023-01-09 RX ORDER — COVID-19 MOLECULAR TEST ASSAY
KIT MISCELLANEOUS
Qty: 8 | Refills: 0 | Status: COMPLETED | COMMUNITY
Start: 2022-06-08 | End: 2023-01-09

## 2023-01-09 RX ORDER — IBUPROFEN 200 MG/1
TABLET, COATED ORAL
Refills: 0 | Status: COMPLETED | COMMUNITY
End: 2023-01-09

## 2023-01-09 RX ORDER — ZOLPIDEM TARTRATE 10 MG/1
10 TABLET, FILM COATED ORAL
Refills: 0 | Status: COMPLETED | COMMUNITY
End: 2023-01-09

## 2023-01-09 RX ORDER — CEPHALEXIN 500 MG/1
500 CAPSULE ORAL ONCE
Qty: 4 | Refills: 0 | Status: COMPLETED | COMMUNITY
Start: 2020-02-28 | End: 2023-01-09

## 2023-01-09 RX ORDER — CEPHALEXIN 500 MG/1
500 CAPSULE ORAL ONCE
Qty: 4 | Refills: 0 | Status: COMPLETED | COMMUNITY
Start: 2022-06-08 | End: 2023-01-09

## 2023-01-09 NOTE — HISTORY OF PRESENT ILLNESS
[FreeTextEntry1] : Here for pre-operative ID evaluation--R TKA now planned for 1/17/23 at [facility] (Dr. Solorzano). \par She is having pre-op labs done by PCP Dr. Carlson today.\par Has a loose frontal tooth--saw dentist and told she did not require extraction; continues to take cephalexin for SBP ppx prior to dental work.\par

## 2023-01-09 NOTE — ASSESSMENT
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

## 2023-01-09 NOTE — PHYSICAL EXAM
[General Appearance - Alert] : alert [General Appearance - In No Acute Distress] : in no acute distress [Auscultation Breath Sounds / Voice Sounds] : lungs were clear to auscultation bilaterally [Musculoskeletal - Swelling] : no joint swelling [Nail Clubbing] : no clubbing  or cyanosis of the fingernails [Motor Tone] : muscle strength and tone were normal [Skin Color & Pigmentation] : normal skin color and pigmentation [] : no rash [Deep Tendon Reflexes (DTR)] : deep tendon reflexes were 2+ and symmetric [Sensation] : the sensory exam was normal to light touch and pinprick [No Focal Deficits] : no focal deficits [Oriented To Time, Place, And Person] : oriented to person, place, and time [Affect] : the affect was normal

## 2023-01-12 RX ORDER — CEPHALEXIN 500 MG/1
500 CAPSULE ORAL EVERY 8 HOURS
Qty: 270 | Refills: 0 | Status: ACTIVE | COMMUNITY
Start: 2023-01-12 | End: 1900-01-01

## 2023-03-13 ENCOUNTER — APPOINTMENT (OUTPATIENT)
Dept: INFECTIOUS DISEASE | Facility: CLINIC | Age: 73
End: 2023-03-13
Payer: MEDICARE

## 2023-03-13 DIAGNOSIS — M25.561 PAIN IN RIGHT KNEE: ICD-10-CM

## 2023-03-13 PROCEDURE — 99447 NTRPROF PH1/NTRNET/EHR 11-20: CPT

## 2023-03-30 ENCOUNTER — APPOINTMENT (OUTPATIENT)
Dept: INFECTIOUS DISEASE | Facility: CLINIC | Age: 73
End: 2023-03-30
Payer: MEDICARE

## 2023-03-30 VITALS
DIASTOLIC BLOOD PRESSURE: 75 MMHG | WEIGHT: 167 LBS | OXYGEN SATURATION: 96 % | BODY MASS INDEX: 30.73 KG/M2 | HEIGHT: 62 IN | HEART RATE: 93 BPM | TEMPERATURE: 96.3 F | SYSTOLIC BLOOD PRESSURE: 144 MMHG

## 2023-03-30 DIAGNOSIS — Z79.2 LONG TERM (CURRENT) USE OF ANTIBIOTICS: ICD-10-CM

## 2023-03-30 DIAGNOSIS — Z29.8 ENCOUNTER FOR OTHER SPECIFIED PROPHYLACTIC MEASURES: ICD-10-CM

## 2023-03-30 PROCEDURE — 99214 OFFICE O/P EST MOD 30 MIN: CPT

## 2023-03-30 RX ORDER — CEPHALEXIN 500 MG/1
500 CAPSULE ORAL EVERY 8 HOURS
Qty: 63 | Refills: 0 | Status: ACTIVE | COMMUNITY
Start: 2023-03-30 | End: 1900-01-01

## 2023-03-30 NOTE — PHYSICAL EXAM
[General Appearance - Alert] : alert [General Appearance - In No Acute Distress] : in no acute distress [Auscultation Breath Sounds / Voice Sounds] : lungs were clear to auscultation bilaterally [Heart Rate And Rhythm] : heart rate was normal and rhythm regular [Heart Sounds] : normal S1 and S2 [Heart Sounds Gallop] : no gallops [Murmurs] : no murmurs [Heart Sounds Pericardial Friction Rub] : no pericardial rub [FreeTextEntry1] : R knee incision healed; ROM intact; trace effusion [Skin Color & Pigmentation] : normal skin color and pigmentation [] : no rash [Oriented To Time, Place, And Person] : oriented to person, place, and time [Affect] : the affect was normal

## 2023-03-30 NOTE — ASSESSMENT
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 D/w patient insomnia would be an unlikely effect of cephalexin which she has been tolerating. Would evaluate other causes of insomnia/daytime sleepiness ?CONSUELO with PCP.\par - advise complete cephalexin suppressive therapy X 90d ~4/19/23\par - plan for CBC, CMP, ESR, CRP\par \par rtc prior to next planned shoulder surgery [Treatment Adherence] : treatment adherence [Risk Reduction] : risk reduction [Anticipatory Guidance] : anticipatory guidance

## 2023-03-30 NOTE — HISTORY OF PRESENT ILLNESS
[FreeTextEntry1] : Feels generally well. R knee incision healed. Endorsing R knee pain but no swelling; ambulating. Insomnia has improved--she slept well two nights ago; less well last night. Taking trazodone. Pending L shoulder surgery.

## 2023-04-03 LAB
ALBUMIN SERPL ELPH-MCNC: 4.5 G/DL
ALP BLD-CCNC: 76 U/L
ALT SERPL-CCNC: 9 U/L
ANION GAP SERPL CALC-SCNC: 13 MMOL/L
AST SERPL-CCNC: 12 U/L
BASOPHILS # BLD AUTO: 0.11 K/UL
BASOPHILS NFR BLD AUTO: 1.4 %
BILIRUB SERPL-MCNC: 0.2 MG/DL
BUN SERPL-MCNC: 13 MG/DL
CALCIUM SERPL-MCNC: 10.6 MG/DL
CHLORIDE SERPL-SCNC: 97 MMOL/L
CO2 SERPL-SCNC: 29 MMOL/L
CREAT SERPL-MCNC: 0.91 MG/DL
CRP SERPL-MCNC: 3 MG/L
EGFR: 66 ML/MIN/1.73M2
EOSINOPHIL # BLD AUTO: 0.35 K/UL
EOSINOPHIL NFR BLD AUTO: 4.6 %
ERYTHROCYTE [SEDIMENTATION RATE] IN BLOOD BY WESTERGREN METHOD: 55 MM/HR
GLUCOSE SERPL-MCNC: 93 MG/DL
HCT VFR BLD CALC: 40.7 %
HGB BLD-MCNC: 13.3 G/DL
IMM GRANULOCYTES NFR BLD AUTO: 0.3 %
LYMPHOCYTES # BLD AUTO: 2.57 K/UL
LYMPHOCYTES NFR BLD AUTO: 33.6 %
MAN DIFF?: NORMAL
MCHC RBC-ENTMCNC: 30.2 PG
MCHC RBC-ENTMCNC: 32.7 GM/DL
MCV RBC AUTO: 92.3 FL
MONOCYTES # BLD AUTO: 0.55 K/UL
MONOCYTES NFR BLD AUTO: 7.2 %
NEUTROPHILS # BLD AUTO: 4.05 K/UL
NEUTROPHILS NFR BLD AUTO: 52.9 %
PLATELET # BLD AUTO: 485 K/UL
POTASSIUM SERPL-SCNC: 4 MMOL/L
PROT SERPL-MCNC: 6.8 G/DL
RBC # BLD: 4.41 M/UL
RBC # FLD: 13.2 %
SODIUM SERPL-SCNC: 139 MMOL/L
WBC # FLD AUTO: 7.65 K/UL

## 2023-05-22 ENCOUNTER — APPOINTMENT (OUTPATIENT)
Dept: OPHTHALMOLOGY | Facility: CLINIC | Age: 73
End: 2023-05-22

## 2023-05-22 ENCOUNTER — OUTPATIENT (OUTPATIENT)
Dept: OUTPATIENT SERVICES | Facility: HOSPITAL | Age: 73
LOS: 1 days | End: 2023-05-22

## 2023-05-22 DIAGNOSIS — Z96.652 PRESENCE OF LEFT ARTIFICIAL KNEE JOINT: Chronic | ICD-10-CM

## 2023-05-22 DIAGNOSIS — S46.112A STRAIN OF MUSCLE, FASCIA AND TENDON OF LONG HEAD OF BICEPS, LEFT ARM, INITIAL ENCOUNTER: Chronic | ICD-10-CM

## 2023-05-23 DIAGNOSIS — H26.9 UNSPECIFIED CATARACT: ICD-10-CM

## 2023-07-10 ENCOUNTER — APPOINTMENT (OUTPATIENT)
Dept: PULMONOLOGY | Facility: CLINIC | Age: 73
End: 2023-07-10
Payer: MEDICARE

## 2023-07-10 VITALS
BODY MASS INDEX: 27.82 KG/M2 | HEART RATE: 100 BPM | SYSTOLIC BLOOD PRESSURE: 122 MMHG | OXYGEN SATURATION: 97 % | DIASTOLIC BLOOD PRESSURE: 75 MMHG | HEIGHT: 65 IN | WEIGHT: 167 LBS | TEMPERATURE: 97.9 F

## 2023-07-10 PROCEDURE — 99213 OFFICE O/P EST LOW 20 MIN: CPT | Mod: 25

## 2023-07-10 PROCEDURE — 94010 BREATHING CAPACITY TEST: CPT

## 2023-07-10 RX ORDER — FLUTICASONE FUROATE AND VILANTEROL TRIFENATATE 100; 25 UG/1; UG/1
100-25 POWDER RESPIRATORY (INHALATION)
Qty: 60 | Refills: 11 | Status: ACTIVE | COMMUNITY
Start: 2021-04-16 | End: 1900-01-01

## 2023-07-11 RX ORDER — BUDESONIDE AND FORMOTEROL FUMARATE DIHYDRATE 80; 4.5 UG/1; UG/1
80-4.5 AEROSOL RESPIRATORY (INHALATION)
Qty: 3 | Refills: 3 | Status: ACTIVE | COMMUNITY
Start: 2023-07-11 | End: 1900-01-01

## 2023-07-11 NOTE — PHYSICAL EXAM
[No Acute Distress] : no acute distress [Normal Oropharynx] : normal oropharynx [Normal Appearance] : normal appearance [No Neck Mass] : no neck mass [Normal Rate/Rhythm] : normal rate/rhythm [Normal S1, S2] : normal s1, s2 [No Murmurs] : no murmurs [No Resp Distress] : no resp distress [Clear to Auscultation Bilaterally] : clear to auscultation bilaterally [No Abnormalities] : no abnormalities [Benign] : benign [Normal Gait] : normal gait [No Clubbing] : no clubbing [No Cyanosis] : no cyanosis [No Edema] : no edema [FROM] : FROM [Normal Color/ Pigmentation] : normal color/ pigmentation [No Focal Deficits] : no focal deficits [Oriented x3] : oriented x3 [Normal Affect] : normal affect [TextBox_105] : in sling after sugery

## 2023-07-11 NOTE — HISTORY OF PRESENT ILLNESS
[TextBox_4] : doing well.on breo\par \par recent shoulder replacment\par \par breathing well and using meds diligently\par \par no sob on this regimen\par \par occ misses dosage but this doesn't appear to affect her\par \par luigi person and

## 2023-11-10 NOTE — ED SUB INTERN NOTE - CROS ED NEURO ALL NEG

## 2024-07-15 ENCOUNTER — APPOINTMENT (OUTPATIENT)
Dept: PULMONOLOGY | Facility: CLINIC | Age: 74
End: 2024-07-15
Payer: MEDICARE

## 2024-07-15 VITALS
HEIGHT: 65 IN | BODY MASS INDEX: 27.82 KG/M2 | DIASTOLIC BLOOD PRESSURE: 75 MMHG | SYSTOLIC BLOOD PRESSURE: 120 MMHG | OXYGEN SATURATION: 97 % | WEIGHT: 167 LBS | HEART RATE: 98 BPM | TEMPERATURE: 97.9 F

## 2024-07-15 PROCEDURE — 99214 OFFICE O/P EST MOD 30 MIN: CPT | Mod: 25

## 2024-07-15 PROCEDURE — 94010 BREATHING CAPACITY TEST: CPT

## 2024-07-15 PROCEDURE — 71046 X-RAY EXAM CHEST 2 VIEWS: CPT

## 2025-06-12 ENCOUNTER — APPOINTMENT (OUTPATIENT)
Dept: OPHTHALMOLOGY | Facility: CLINIC | Age: 75
End: 2025-06-12

## 2025-06-12 ENCOUNTER — OUTPATIENT (OUTPATIENT)
Dept: OUTPATIENT SERVICES | Facility: HOSPITAL | Age: 75
LOS: 1 days | End: 2025-06-12

## 2025-06-12 DIAGNOSIS — S46.112A STRAIN OF MUSCLE, FASCIA AND TENDON OF LONG HEAD OF BICEPS, LEFT ARM, INITIAL ENCOUNTER: Chronic | ICD-10-CM

## 2025-06-12 DIAGNOSIS — Z96.652 PRESENCE OF LEFT ARTIFICIAL KNEE JOINT: Chronic | ICD-10-CM

## 2025-06-13 DIAGNOSIS — H26.9 UNSPECIFIED CATARACT: ICD-10-CM

## 2025-07-18 ENCOUNTER — APPOINTMENT (OUTPATIENT)
Dept: PULMONOLOGY | Facility: CLINIC | Age: 75
End: 2025-07-18
Payer: MEDICARE

## 2025-07-18 VITALS
HEART RATE: 77 BPM | WEIGHT: 140 LBS | HEIGHT: 65 IN | SYSTOLIC BLOOD PRESSURE: 121 MMHG | TEMPERATURE: 97.1 F | BODY MASS INDEX: 23.32 KG/M2 | OXYGEN SATURATION: 96 % | DIASTOLIC BLOOD PRESSURE: 74 MMHG

## 2025-07-18 PROCEDURE — 99213 OFFICE O/P EST LOW 20 MIN: CPT | Mod: 25

## 2025-07-18 PROCEDURE — 94010 BREATHING CAPACITY TEST: CPT
